# Patient Record
Sex: FEMALE | Race: WHITE | NOT HISPANIC OR LATINO | Employment: FULL TIME | URBAN - NONMETROPOLITAN AREA
[De-identification: names, ages, dates, MRNs, and addresses within clinical notes are randomized per-mention and may not be internally consistent; named-entity substitution may affect disease eponyms.]

---

## 2017-01-11 ENCOUNTER — OFFICE VISIT (OUTPATIENT)
Dept: URGENT CARE | Facility: PHYSICIAN GROUP | Age: 42
End: 2017-01-11
Payer: COMMERCIAL

## 2017-01-11 ENCOUNTER — HOSPITAL ENCOUNTER (OUTPATIENT)
Facility: MEDICAL CENTER | Age: 42
End: 2017-01-11
Attending: PHYSICIAN ASSISTANT
Payer: COMMERCIAL

## 2017-01-11 VITALS
TEMPERATURE: 98.7 F | HEIGHT: 66 IN | SYSTOLIC BLOOD PRESSURE: 126 MMHG | WEIGHT: 153 LBS | RESPIRATION RATE: 20 BRPM | BODY MASS INDEX: 24.59 KG/M2 | OXYGEN SATURATION: 100 % | DIASTOLIC BLOOD PRESSURE: 68 MMHG | HEART RATE: 77 BPM

## 2017-01-11 DIAGNOSIS — N76.0 ACUTE VAGINITIS: ICD-10-CM

## 2017-01-11 DIAGNOSIS — H10.33 ACUTE BACTERIAL CONJUNCTIVITIS OF BOTH EYES: ICD-10-CM

## 2017-01-11 LAB
CANDIDA DNA VAG QL PROBE+SIG AMP: POSITIVE
G VAGINALIS DNA VAG QL PROBE+SIG AMP: NEGATIVE
T VAGINALIS DNA VAG QL PROBE+SIG AMP: NEGATIVE

## 2017-01-11 PROCEDURE — 87510 GARDNER VAG DNA DIR PROBE: CPT

## 2017-01-11 PROCEDURE — 87491 CHLMYD TRACH DNA AMP PROBE: CPT

## 2017-01-11 PROCEDURE — 99203 OFFICE O/P NEW LOW 30 MIN: CPT | Performed by: PHYSICIAN ASSISTANT

## 2017-01-11 PROCEDURE — 87480 CANDIDA DNA DIR PROBE: CPT

## 2017-01-11 PROCEDURE — 87660 TRICHOMONAS VAGIN DIR PROBE: CPT

## 2017-01-11 PROCEDURE — 87591 N.GONORRHOEAE DNA AMP PROB: CPT

## 2017-01-11 RX ORDER — POLYMYXIN B SULFATE AND TRIMETHOPRIM 1; 10000 MG/ML; [USP'U]/ML
1 SOLUTION OPHTHALMIC EVERY 4 HOURS
Qty: 10 ML | Refills: 0 | Status: SHIPPED | OUTPATIENT
Start: 2017-01-11 | End: 2017-04-15

## 2017-01-11 RX ORDER — FLUCONAZOLE 150 MG/1
TABLET ORAL
Qty: 2 TAB | Refills: 1 | Status: SHIPPED | OUTPATIENT
Start: 2017-01-11 | End: 2017-04-15

## 2017-01-11 RX ORDER — METRONIDAZOLE 500 MG/1
500 TABLET ORAL 2 TIMES DAILY
Qty: 14 TAB | Refills: 0 | Status: SHIPPED | OUTPATIENT
Start: 2017-01-11 | End: 2017-01-18

## 2017-01-11 ASSESSMENT — ENCOUNTER SYMPTOMS
ABDOMINAL PAIN: 0
EYE DISCHARGE: 1
EYE REDNESS: 1
FEVER: 0
CHILLS: 0
DOUBLE VISION: 0
FLANK PAIN: 0
PHOTOPHOBIA: 0
EYE PAIN: 0
VAGINITIS: 1
BLURRED VISION: 0

## 2017-01-11 NOTE — PATIENT INSTRUCTIONS
Vaginitis  Vaginitis is an inflammation of the vagina. It is most often caused by a change in the normal balance of the bacteria and yeast that live in the vagina. This change in balance causes an overgrowth of certain bacteria or yeast, which causes the inflammation. There are different types of vaginitis, but the most common types are:  · Bacterial vaginosis.  · Yeast infection (candidiasis).  · Trichomoniasis vaginitis. This is a sexually transmitted infection (STI).  · Viral vaginitis.  · Atrophic vaginitis.  · Allergic vaginitis.  CAUSES   The cause depends on the type of vaginitis. Vaginitis can be caused by:  · Bacteria (bacterial vaginosis).  · Yeast (yeast infection).  · A parasite (trichomoniasis vaginitis)  · A virus (viral vaginitis).  · Low hormone levels (atrophic vaginitis). Low hormone levels can occur during pregnancy, breastfeeding, or after menopause.  · Irritants, such as bubble baths, scented tampons, and feminine sprays (allergic vaginitis).  Other factors can change the normal balance of the yeast and bacteria that live in the vagina. These include:  · Antibiotic medicines.  · Poor hygiene.  · Diaphragms, vaginal sponges, spermicides, birth control pills, and intrauterine devices (IUD).  · Sexual intercourse.  · Infection.  · Uncontrolled diabetes.  · A weakened immune system.  SYMPTOMS   Symptoms can vary depending on the cause of the vaginitis. Common symptoms include:  · Abnormal vaginal discharge.  ¨ The discharge is white, gray, or yellow with bacterial vaginosis.  ¨ The discharge is thick, white, and cheesy with a yeast infection.  ¨ The discharge is frothy and yellow or greenish with trichomoniasis.  · A bad vaginal odor.  ¨ The odor is fishy with bacterial vaginosis.  · Vaginal itching, pain, or swelling.  · Painful intercourse.  · Pain or burning when urinating.  Sometimes, there are no symptoms.  TREATMENT   Treatment will vary depending on the type of infection.   · Bacterial  vaginosis and trichomoniasis are often treated with antibiotic creams or pills.  · Yeast infections are often treated with antifungal medicines, such as vaginal creams or suppositories.  · Viral vaginitis has no cure, but symptoms can be treated with medicines that relieve discomfort. Your sexual partner should be treated as well.  · Atrophic vaginitis may be treated with an estrogen cream, pill, suppository, or vaginal ring. If vaginal dryness occurs, lubricants and moisturizing creams may help. You may be told to avoid scented soaps, sprays, or douches.  · Allergic vaginitis treatment involves quitting the use of the product that is causing the problem. Vaginal creams can be used to treat the symptoms.  HOME CARE INSTRUCTIONS   · Take all medicines as directed by your caregiver.  · Keep your genital area clean and dry. Avoid soap and only rinse the area with water.  · Avoid douching. It can remove the healthy bacteria in the vagina.  · Do not use tampons or have sexual intercourse until your vaginitis has been treated. Use sanitary pads while you have vaginitis.  · Wipe from front to back. This avoids the spread of bacteria from the rectum to the vagina.  · Let air reach your genital area.  ¨ Wear cotton underwear to decrease moisture buildup.  ¨ Avoid wearing underwear while you sleep until your vaginitis is gone.  ¨ Avoid tight pants and underwear or nylons without a cotton panel.  ¨ Take off wet clothing (especially bathing suits) as soon as possible.  · Use mild, non-scented products. Avoid using irritants, such as:  ¨ Scented feminine sprays.  ¨ Fabric softeners.  ¨ Scented detergents.  ¨ Scented tampons.  ¨ Scented soaps or bubble baths.  · Practice safe sex and use condoms. Condoms may prevent the spread of trichomoniasis and viral vaginitis.  SEEK MEDICAL CARE IF:   · You have abdominal pain.  · You have a fever or persistent symptoms for more than 2-3 days.  · You have a fever and your symptoms suddenly  "get worse.     This information is not intended to replace advice given to you by your health care provider. Make sure you discuss any questions you have with your health care provider.     Document Released: 10/14/2008 Document Revised: 05/03/2016 Document Reviewed: 05/30/2013  [employer] Interactive Patient Education ©2016 [employer]    Conjunctivitis  Conjunctivitis is commonly called \"pink eye.\" Conjunctivitis can be caused by bacterial or viral infection, allergies, or injuries. There is usually redness of the lining of the eye, itching, discomfort, and sometimes discharge. There may be deposits of matter along the eyelids. A viral infection usually causes a watery discharge, while a bacterial infection causes a yellowish, thick discharge. Pink eye is very contagious and spreads by direct contact.  You may be given antibiotic eyedrops as part of your treatment. Before using your eye medicine, remove all drainage from the eye by washing gently with warm water and cotton balls. Continue to use the medication until you have awakened 2 mornings in a row without discharge from the eye. Do not rub your eye. This increases the irritation and helps spread infection. Use separate towels from other household members. Wash your hands with soap and water before and after touching your eyes. Use cold compresses to reduce pain and sunglasses to relieve irritation from light. Do not wear contact lenses or wear eye makeup until the infection is gone.  SEEK MEDICAL CARE IF:   · Your symptoms are not better after 3 days of treatment.  · You have increased pain or trouble seeing.  · The outer eyelids become very red or swollen.  Document Released: 01/25/2006 Document Revised: 03/11/2013 Document Reviewed: 12/18/2006  ExitCare® Patient Information ©2014 [employer].    "

## 2017-01-11 NOTE — MR AVS SNAPSHOT
"        Cary Cano   2017 12:30 PM   Office Visit   MRN: [id number]    Department:  [facility]   Dept Phone:  [phone]    Description:  Female : 1975   Provider:  CRYSTAL Rogers           Reason for Visit     Vaginitis x3wks Pt states she may have yeast infection, OTC meds with no improvement, x2day eye swelling/redness      Allergies as of 2017     No Known Allergies      You were diagnosed with     Acute vaginitis   [888342]   Fluctuating for 3 weeks. Cultures obtained. Will treat for presumed bacterial and candidal infection. Follow-up with PCP as needed.    Acute bacterial conjunctivitis of both eyes   [[id number]]   Bilateral conjunctival erythema with purulent discharge. Start Polytrim. Follow-up with PCP as needed. Given written instructions.      Vital Signs     Blood Pressure Pulse Temperature Respirations Height Weight    126/68 mmHg 77 37.1 °C (98.7 °F) 20 1.676 m (5' 6\") 69.4 kg (153 lb)    Body Mass Index Oxygen Saturation Breastfeeding? Smoking Status          24.71 kg/m2 100% No Never Smoker         Basic Information     Date Of Birth Sex Race Ethnicity Preferred Language    1975 Female White Non- English      Health Maintenance     Patient has no pending health maintenance at this time      Current Immunizations     No immunizations on file.      Below and/or attached are the medications your provider expects you to take. Review all of your home medications and newly ordered medications with your provider and/or pharmacist. Follow medication instructions as directed by your provider and/or pharmacist. Please keep your medication list with you and share with your provider. Update the information when medications are discontinued, doses are changed, or new medications (including over-the-counter products) are added; and carry medication information at all times in the event of emergency situations     Allergies:  No Known Allergies          Medications  Valid " as of: January 11, 2017 -  1:01 PM    Generic Name Brand Name Tablet Size Instructions for use    Fluconazole (Tab) DIFLUCAN 150 MG Take 1 tab now and one in 72 hours if not resolved.        MetroNIDAZOLE (Tab) FLAGYL 500 MG Take 1 Tab by mouth 2 Times a Day for 7 days.        Phenazopyridine HCl   Take  by mouth.        Polymyxin B-Trimethoprim (Solution) POLYTRIM 72304-6.1 UNIT/ML-% Place 1 Drop in both eyes every 4 hours.        .                 Medicines prescribed today were sent to:     [address] [address]    Phone: [phone] Fax: [phone]    Open 24 Hours?: No      Medication refill instructions:       If your prescription bottle indicates you have medication refills left, it is not necessary to call your provider’s office. Please contact your pharmacy and they will refill your medication.    If your prescription bottle indicates you do not have any refills left, you may request refills at any time through one of the following ways: The online [employer] system (except Urgent Care), by calling your provider’s office, or by asking your pharmacy to contact your provider’s office with a refill request. Medication refills are processed only during regular business hours and may not be available until the next business day. Your provider may request additional information or to have a follow-up visit with you prior to refilling your medication.   *Please Note: Medication refills are assigned a new Rx number when refilled electronically. Your pharmacy may indicate that no refills were authorized even though a new prescription for the same medication is available at the pharmacy. Please request the medicine by name with the pharmacy before contacting your provider for a refill.        Your To Do List     Future Labs/Procedures Complete By Expires    CHLAMYDIA/GC PCR URINE OR SWAB  As directed 1/11/2018    VAGINAL PATHOGENS DNA PANEL  As  directed 1/12/2018      Instructions    Vaginitis  Vaginitis is an inflammation of the vagina. It is most often caused by a change in the normal balance of the bacteria and yeast that live in the vagina. This change in balance causes an overgrowth of certain bacteria or yeast, which causes the inflammation. There are different types of vaginitis, but the most common types are:  · Bacterial vaginosis.  · Yeast infection (candidiasis).  · Trichomoniasis vaginitis. This is a sexually transmitted infection (STI).  · Viral vaginitis.  · Atrophic vaginitis.  · Allergic vaginitis.  CAUSES   The cause depends on the type of vaginitis. Vaginitis can be caused by:  · Bacteria (bacterial vaginosis).  · Yeast (yeast infection).  · A parasite (trichomoniasis vaginitis)  · A virus (viral vaginitis).  · Low hormone levels (atrophic vaginitis). Low hormone levels can occur during pregnancy, breastfeeding, or after menopause.  · Irritants, such as bubble baths, scented tampons, and feminine sprays (allergic vaginitis).  Other factors can change the normal balance of the yeast and bacteria that live in the vagina. These include:  · Antibiotic medicines.  · Poor hygiene.  · Diaphragms, vaginal sponges, spermicides, birth control pills, and intrauterine devices (IUD).  · Sexual intercourse.  · Infection.  · Uncontrolled diabetes.  · A weakened immune system.  SYMPTOMS   Symptoms can vary depending on the cause of the vaginitis. Common symptoms include:  · Abnormal vaginal discharge.  ¨ The discharge is white, gray, or yellow with bacterial vaginosis.  ¨ The discharge is thick, white, and cheesy with a yeast infection.  ¨ The discharge is frothy and yellow or greenish with trichomoniasis.  · A bad vaginal odor.  ¨ The odor is fishy with bacterial vaginosis.  · Vaginal itching, pain, or swelling.  · Painful intercourse.  · Pain or burning when urinating.  Sometimes, there are no symptoms.  TREATMENT   Treatment will vary depending on the  type of infection.   · Bacterial vaginosis and trichomoniasis are often treated with antibiotic creams or pills.  · Yeast infections are often treated with antifungal medicines, such as vaginal creams or suppositories.  · Viral vaginitis has no cure, but symptoms can be treated with medicines that relieve discomfort. Your sexual partner should be treated as well.  · Atrophic vaginitis may be treated with an estrogen cream, pill, suppository, or vaginal ring. If vaginal dryness occurs, lubricants and moisturizing creams may help. You may be told to avoid scented soaps, sprays, or douches.  · Allergic vaginitis treatment involves quitting the use of the product that is causing the problem. Vaginal creams can be used to treat the symptoms.  HOME CARE INSTRUCTIONS   · Take all medicines as directed by your caregiver.  · Keep your genital area clean and dry. Avoid soap and only rinse the area with water.  · Avoid douching. It can remove the healthy bacteria in the vagina.  · Do not use tampons or have sexual intercourse until your vaginitis has been treated. Use sanitary pads while you have vaginitis.  · Wipe from front to back. This avoids the spread of bacteria from the rectum to the vagina.  · Let air reach your genital area.  ¨ Wear cotton underwear to decrease moisture buildup.  ¨ Avoid wearing underwear while you sleep until your vaginitis is gone.  ¨ Avoid tight pants and underwear or nylons without a cotton panel.  ¨ Take off wet clothing (especially bathing suits) as soon as possible.  · Use mild, non-scented products. Avoid using irritants, such as:  ¨ Scented feminine sprays.  ¨ Fabric softeners.  ¨ Scented detergents.  ¨ Scented tampons.  ¨ Scented soaps or bubble baths.  · Practice safe sex and use condoms. Condoms may prevent the spread of trichomoniasis and viral vaginitis.  SEEK MEDICAL CARE IF:   · You have abdominal pain.  · You have a fever or persistent symptoms for more than 2-3 days.  · You have a  "fever and your symptoms suddenly get worse.     This information is not intended to replace advice given to you by your health care provider. Make sure you discuss any questions you have with your health care provider.     Document Released: 10/14/2008 Document Revised: 05/03/2016 Document Reviewed: 05/30/2013  [employer] Interactive Patient Education ©2016 [employer]    Conjunctivitis  Conjunctivitis is commonly called \"pink eye.\" Conjunctivitis can be caused by bacterial or viral infection, allergies, or injuries. There is usually redness of the lining of the eye, itching, discomfort, and sometimes discharge. There may be deposits of matter along the eyelids. A viral infection usually causes a watery discharge, while a bacterial infection causes a yellowish, thick discharge. Pink eye is very contagious and spreads by direct contact.  You may be given antibiotic eyedrops as part of your treatment. Before using your eye medicine, remove all drainage from the eye by washing gently with warm water and cotton balls. Continue to use the medication until you have awakened 2 mornings in a row without discharge from the eye. Do not rub your eye. This increases the irritation and helps spread infection. Use separate towels from other household members. Wash your hands with soap and water before and after touching your eyes. Use cold compresses to reduce pain and sunglasses to relieve irritation from light. Do not wear contact lenses or wear eye makeup until the infection is gone.  SEEK MEDICAL CARE IF:   · Your symptoms are not better after 3 days of treatment.  · You have increased pain or trouble seeing.  · The outer eyelids become very red or swollen.  Document Released: 01/25/2006 Document Revised: 03/11/2013 Document Reviewed: 12/18/2006  ExitCare® Patient Information ©2014 [employer].            [facility] Access Code: [email or web address]  Expires: 2/10/2017  1:01 PM    [employer] [facility]  A secure, online tool to manage your " health information     [employer]s [employer]® is a secure, online tool that connects you to your personalized health information from the privacy of your home -- day or night - making it very easy for you to manage your healthcare. Once the activation process is completed, you can even access your medical information using the [employer] allan, which is available for free in the Apple Allan store or Google Play store.     [employer] provides the following levels of access (as shown below):   My Chart Features   [facility] Doctor [facility]  Specialists [facility]  Urgent  Care Non-[facility]  Primary Care  Doctor   Email your healthcare team securely and privately 24/7 X X X    Manage appointments: schedule your next appointment; view details of past/upcoming appointments X      Request prescription refills. X      View recent personal medical records, including lab and immunizations X X X X   View health record, including health history, allergies, medications X X X X   Read reports about your outpatient visits, procedures, consult and ER notes X X X X   See your discharge summary, which is a recap of your hospital and/or ER visit that includes your diagnosis, lab results, and care plan. X X       How to register for [employer]:  1. Go to  [email or web address].  2. Click on the Sign Up Now box, which takes you to the New Member Sign Up page. You will need to provide the following information:  a. Enter your [employer] Access Code exactly as it appears at the top of this page. (You will not need to use this code after you’ve completed the sign-up process. If you do not sign up before the expiration date, you must request a new code.)   b. Enter your date of birth.   c. Enter your home email address.   d. Click Submit, and follow the next screen’s instructions.  3. Create a [employer] ID. This will be your [employer] login ID and cannot be changed, so think of one that is secure and easy to remember.  4. Create a [employer] password.  You can change your password at any time.  5. Enter your Password Reset Question and Answer. This can be used at a later time if you forget your password.   6. Enter your e-mail address. This allows you to receive e-mail notifications when new information is available in [employer].  7. Click Sign Up. You can now view your health information.    For assistance activating your [employer] account, call [phone]

## 2017-01-11 NOTE — PROGRESS NOTES
Subjective:      Cary Cano is a 41 y.o. female who presents with Vaginitis            HPI Comments: Patient presents today with 3 week history of vaginal irritation, itching, and mild discharge. She is prone to yeast and bacterial vaginal infections. She has tried over-the-counter antifungal medications without any significant improvement in symptoms. She would like to be treated for both today.    She also reports bilateral eye redness and discharge for the last 2 days. This morning when she woke up she had purulent discharge coming from both eyes. She tried over-the-counter allergy eyedrops with mild improvement in irritation, but continued purulent discharge.    Vaginitis  The patient's primary symptoms include genital itching and vaginal discharge. The patient's pertinent negatives include no genital odor or genital rash. This is a new problem. The current episode started 1 to 4 weeks ago. The problem occurs constantly. The problem has been waxing and waning. The patient is experiencing no pain. The problem affects both sides. She is not pregnant. Pertinent negatives include no abdominal pain, chills, discolored urine, dysuria, fever, flank pain, frequency, hematuria or urgency. The vaginal discharge was thin. There has been no bleeding. She has not been passing clots. She has not been passing tissue. Nothing aggravates the symptoms. She has tried antifungals for the symptoms. The treatment provided no relief. She is sexually active. It is possible that her partner has an STD.       Review of Systems   Constitutional: Negative for fever and chills.   Eyes: Positive for discharge and redness. Negative for blurred vision, double vision, photophobia and pain.   Gastrointestinal: Negative for abdominal pain.   Genitourinary: Positive for vaginal discharge. Negative for dysuria, urgency, frequency, hematuria and flank pain.     Allergies:Review of patient's allergies indicates no known allergies.    Current  "Outpatient Prescriptions Ordered in [facility]   Medication Sig Dispense Refill   • Phenazopyridine HCl (AZO TABS PO) Take  by mouth.     • metronidazole (FLAGYL) 500 MG Tab Take 1 Tab by mouth 2 Times a Day for 7 days. 14 Tab 0   • fluconazole (DIFLUCAN) 150 MG tablet Take 1 tab now and one in 72 hours if not resolved. 2 Tab 1   • polymixin-trimethoprim (POLYTRIM) 72138-2.1 UNIT/ML-% Solution Place 1 Drop in both eyes every 4 hours. 10 mL 0     No current Epic-ordered facility-administered medications on file.       History reviewed. No pertinent past medical history.    Social History   Substance Use Topics   • Smoking status: Never Smoker    • Smokeless tobacco: Never Used   • Alcohol Use: Yes       No family status information on file.   History reviewed. No pertinent family history.       Objective:     /68 mmHg  Pulse 77  Temp(Src) 37.1 °C (98.7 °F)  Resp 20  Ht 1.676 m (5' 6\")  Wt 69.4 kg (153 lb)  BMI 24.71 kg/m2  SpO2 100%  Breastfeeding? No     Physical Exam   Constitutional: She is oriented to person, place, and time. She appears well-developed and well-nourished. No distress.   HENT:   Head: Normocephalic and atraumatic.   Eyes: EOM are normal. Pupils are equal, round, and reactive to light. Right eye exhibits discharge. Left eye exhibits no discharge.   Bilateral conjunctival erythema with mild purulent discharge from the right eye   Neck: Normal range of motion. Neck supple.   Cardiovascular: Normal rate.    Pulmonary/Chest: Effort normal.   Genitourinary:   Chaperone: FRANK Pedroza. Mild labial erythema and tenderness. Internal exam mildly discomfort uncomfortable. Vaginal erythema with thick, white discharge and also thinner, yellow discharge. Cervix is mildly erythematous with discharge present. IUD strings visible.   Neurological: She is alert and oriented to person, place, and time.   Skin: Skin is warm and dry. She is not diaphoretic.   Psychiatric: She has a normal mood and affect. Her " behavior is normal. Judgment and thought content normal.   Nursing note and vitals reviewed.              Assessment/Plan:     1. Acute vaginitis  CHLAMYDIA/GC PCR URINE OR SWAB    VAGINAL PATHOGENS DNA PANEL    metronidazole (FLAGYL) 500 MG Tab    fluconazole (DIFLUCAN) 150 MG tablet    Fluctuating for 3 weeks. Cultures obtained. Will treat for presumed bacterial and candidal infection. Follow-up with PCP as needed.   2. Acute bacterial conjunctivitis of both eyes  polymixin-trimethoprim (POLYTRIM) 50170-6.1 UNIT/ML-% Solution    Bilateral conjunctival erythema with purulent discharge. Start Polytrim. Follow-up with PCP as needed. Given written instructions.       [employer] Interactive Patient Education given: Vaginitis, conjunctivitis    Please note that this dictation was created using voice recognition software. I have made every reasonable attempt to correct obvious errors, but I expect that there are errors of grammar and possibly content that I did not discover before finalizing the note.

## 2017-01-12 LAB
C TRACH DNA GENITAL QL NAA+PROBE: NEGATIVE
N GONORRHOEA DNA GENITAL QL NAA+PROBE: NEGATIVE
SPECIMEN SOURCE: NORMAL

## 2017-04-15 ENCOUNTER — OFFICE VISIT (OUTPATIENT)
Dept: URGENT CARE | Facility: PHYSICIAN GROUP | Age: 42
End: 2017-04-15
Payer: COMMERCIAL

## 2017-04-15 VITALS
TEMPERATURE: 98.7 F | HEART RATE: 108 BPM | BODY MASS INDEX: 26.48 KG/M2 | WEIGHT: 164 LBS | DIASTOLIC BLOOD PRESSURE: 82 MMHG | RESPIRATION RATE: 16 BRPM | SYSTOLIC BLOOD PRESSURE: 124 MMHG | OXYGEN SATURATION: 98 %

## 2017-04-15 DIAGNOSIS — R31.9 HEMATURIA: ICD-10-CM

## 2017-04-15 DIAGNOSIS — R30.0 DYSURIA: ICD-10-CM

## 2017-04-15 PROCEDURE — 99214 OFFICE O/P EST MOD 30 MIN: CPT | Performed by: PHYSICIAN ASSISTANT

## 2017-04-15 RX ORDER — NITROFURANTOIN 25; 75 MG/1; MG/1
100 CAPSULE ORAL 2 TIMES DAILY
Qty: 10 CAP | Refills: 0 | Status: SHIPPED | OUTPATIENT
Start: 2017-04-15 | End: 2017-04-20

## 2017-04-15 RX ORDER — PHENAZOPYRIDINE HYDROCHLORIDE 200 MG/1
200 TABLET, FILM COATED ORAL 3 TIMES DAILY PRN
Qty: 6 TAB | Refills: 0 | Status: SHIPPED | OUTPATIENT
Start: 2017-04-15 | End: 2023-11-14

## 2017-04-15 ASSESSMENT — ENCOUNTER SYMPTOMS
SWEATS: 0
FLANK PAIN: 0
VOMITING: 0
FEVER: 0
CHILLS: 0
NAUSEA: 0

## 2017-04-15 NOTE — MR AVS SNAPSHOT
Cary Cano   4/15/2017 1:45 PM   Office Visit   MRN: [id number]    Department:  [facility]   Dept Phone:  [phone]    Description:  Female : 1975   Provider:  WAYNE Rogers.           Reason for Visit     UTI           Allergies as of 4/15/2017     No Known Allergies      You were diagnosed with     Dysuria   [788.1.ICD-9-CM]   Dysuria, urgency, frequency for a couple of days. Clinically appears to be UTI. Urinalysis unremarkable. Contingent Macrobid. Follow-up with PCP    Hematuria   [[id number]]   Trace hematuria. Suspect infection. Given written instructions. Follow-up with PCP.      Vital Signs     Blood Pressure Pulse Temperature Respirations Weight Oxygen Saturation    124/82 mmHg 108 37.1 °C (98.7 °F) 16 74.39 kg (164 lb) 98%    Smoking Status                   Never Smoker            Basic Information     Date Of Birth Sex Race Ethnicity Preferred Language    1975 Female White Non- English      Health Maintenance        Date Due Completion Dates    IMM DTaP/Tdap/Td Vaccine (1 - Tdap) 1995 ---    PAP SMEAR 1997 ---    MAMMOGRAM 2016 ---            Current Immunizations     No immunizations on file.      Below and/or attached are the medications your provider expects you to take. Review all of your home medications and newly ordered medications with your provider and/or pharmacist. Follow medication instructions as directed by your provider and/or pharmacist. Please keep your medication list with you and share with your provider. Update the information when medications are discontinued, doses are changed, or new medications (including over-the-counter products) are added; and carry medication information at all times in the event of emergency situations     Allergies:  No Known Allergies          Medications  Valid as of: April 15, 2017 -  2:15 PM    Generic Name Brand Name Tablet Size Instructions for use    Nitrofurantoin Monohyd Macro (Cap) MACROBID  100 MG Take 1 Cap by mouth 2 times a day for 5 days.        Phenazopyridine HCl (Tab) PYRIDIUM 200 MG Take 1 Tab by mouth 3 times a day as needed.        .                 Medicines prescribed today were sent to:     [address] [address]    Phone: [phone] Fax: [phone]    Open 24 Hours?: No      Medication refill instructions:       If your prescription bottle indicates you have medication refills left, it is not necessary to call your provider’s office. Please contact your pharmacy and they will refill your medication.    If your prescription bottle indicates you do not have any refills left, you may request refills at any time through one of the following ways: The online TermScout system (except Urgent Care), by calling your provider’s office, or by asking your pharmacy to contact your provider’s office with a refill request. Medication refills are processed only during regular business hours and may not be available until the next business day. Your provider may request additional information or to have a follow-up visit with you prior to refilling your medication.   *Please Note: Medication refills are assigned a new Rx number when refilled electronically. Your pharmacy may indicate that no refills were authorized even though a new prescription for the same medication is available at the pharmacy. Please request the medicine by name with the pharmacy before contacting your provider for a refill.        Instructions    Dysuria  Dysuria is pain or discomfort while urinating. The pain or discomfort may be felt in the tube that carries urine out of the bladder (urethra) or in the surrounding tissue of the genitals. The pain may also be felt in the groin area, lower abdomen, and lower back. You may have to urinate frequently or have the sudden feeling that you have to urinate (urgency). Dysuria can affect both men and women, but is more  common in women.  Dysuria can be caused by many different things, including:  · Urinary tract infection in women.  · Infection of the kidney or bladder.  · Kidney stones or bladder stones.  · Certain sexually transmitted infections (STIs), such as chlamydia.  · Dehydration.  · Inflammation of the vagina.  · Use of certain medicines.  · Use of certain soaps or scented products that cause irritation.  HOME CARE INSTRUCTIONS  Watch your dysuria for any changes. The following actions may help to reduce any discomfort you are feeling:  · Drink enough fluid to keep your urine clear or pale yellow.  · Empty your bladder often. Avoid holding urine for long periods of time.  · After a bowel movement or urination, women should cleanse from front to back, using each tissue only once.  · Empty your bladder after sexual intercourse.  · Take medicines only as directed by your health care provider.  · If you were prescribed an antibiotic medicine, finish it all even if you start to feel better.  · Avoid caffeine, tea, and alcohol. They can irritate the bladder and make dysuria worse. In men, alcohol may irritate the prostate.  · Keep all follow-up visits as directed by your health care provider. This is important.  · If you had any tests done to find the cause of dysuria, it is your responsibility to obtain your test results. Ask the lab or department performing the test when and how you will get your results. Talk with your health care provider if you have any questions about your results.  SEEK MEDICAL CARE IF:  · You develop pain in your back or sides.  · You have a fever.  · You have nausea or vomiting.  · You have blood in your urine.  · You are not urinating as often as you usually do.  SEEK IMMEDIATE MEDICAL CARE IF:  · You pain is severe and not relieved with medicines.  · You are unable to hold down any fluids.  · You or someone else notices a change in your mental function.  · You have a rapid heartbeat at rest.  · You  have shaking or chills.  · You feel extremely weak.     This information is not intended to replace advice given to you by your health care provider. Make sure you discuss any questions you have with your health care provider.     Document Released: 09/15/2005 Document Revised: 01/08/2016 Document Reviewed: 08/13/2015  [employer] Interactive Patient Education ©2016 Elsevier Inc.    Hematuria  Hematuria is the presence of blood in the urine. This condition can result from many problems. Some of these are:   · Urinary infections.   · Injuries.   · Kidney stones.   · Drug reactions.   · Tumors.   · Other diseases.   The treatment depends on the diagnosis. Further studies may be needed to find the cause even if the hematuria subsides on its own. An exam by an urologist may also be indicated.  If you are bleeding heavily, or have prostate problems, clots can form in the bladder and block the passage of urine. This requires emergency treatment with a catheter and bladder irrigation.   Contact your doctor for follow-up care within the next 2-4 days.  SEEK IMMEDIATE MEDICAL CARE IF:  You develop a fever, abdominal pain, urinary blockage, vomiting, or any other serious problems.  Document Released: 01/25/2006 Document Revised: 03/11/2013 Document Reviewed: 10/30/2009  ExitCare® Patient Information ©2013 [employer].            Tribesports Access Code: [email or web address]  Expires: 5/1/2017 10:01 AM    Tribesports  A secure, online tool to manage your health information     [employer] Tribesports® is a secure, online tool that connects you to your personalized health information from the privacy of your home -- day or night - making it very easy for you to manage your healthcare. Once the activation process is completed, you can even access your medical information using the Tribesports allan, which is available for free in the Apple Allan store or Google Play store.     Tribesports provides the following levels of access (as shown below):   My  Chart Features   [facility] Doctor [facility]  Specialists [facility]  Urgent  Care Non-[facility]  Primary Care  Doctor   Email your healthcare team securely and privately 24/7 X X X    Manage appointments: schedule your next appointment; view details of past/upcoming appointments X      Request prescription refills. X      View recent personal medical records, including lab and immunizations X X X X   View health record, including health history, allergies, medications X X X X   Read reports about your outpatient visits, procedures, consult and ER notes X X X X   See your discharge summary, which is a recap of your hospital and/or ER visit that includes your diagnosis, lab results, and care plan. X X       How to register for [employer]:  1. Go to  [email or web address].  2. Click on the Sign Up Now box, which takes you to the New Member Sign Up page. You will need to provide the following information:  a. Enter your R[employer] Access Code exactly as it appears at the top of this page. (You will not need to use this code after you’ve completed the sign-up process. If you do not sign up before the expiration date, you must request a new code.)   b. Enter your date of birth.   c. Enter your home email address.   d. Click Submit, and follow the next screen’s instructions.  3. Create a Rocket Design ID. This will be your R[employer] login ID and cannot be changed, so think of one that is secure and easy to remember.  4. Create a [employer] password. You can change your password at any time.  5. Enter your Password Reset Question and Answer. This can be used at a later time if you forget your password.   6. Enter your e-mail address. This allows you to receive e-mail notifications when new information is available in [employer].  7. Click Sign Up. You can now view your health information.    For assistance activating your [employer] account, call [phone]

## 2017-04-15 NOTE — PROGRESS NOTES
Subjective:      Cary Cano is a 41 y.o. female who presents with UTI            HPI Comments: Several day history of gradually worsening dysuria, urgency, frequency, and lower abdominal/pelvic discomfort. History of UTIs with similar presentation. No fevers, chills, nausea, or vomiting. Patient took a home test which was positive for leukocytes and nitrates    Dysuria   This is a new problem. The current episode started in the past 7 days. The problem occurs every urination. The problem has been gradually worsening. The quality of the pain is described as aching and burning. The pain is mild. There is a history of pyelonephritis. Associated symptoms include frequency and urgency. Pertinent negatives include no chills, discharge, flank pain, hematuria, hesitancy, nausea, possible pregnancy, sweats or vomiting. She has tried increased fluids (Cystex) for the symptoms. The treatment provided mild relief.       Review of Systems   Constitutional: Negative for fever and chills.   Gastrointestinal: Negative for nausea and vomiting.   Genitourinary: Positive for dysuria, urgency and frequency. Negative for hesitancy, hematuria and flank pain.     Allergies:Review of patient's allergies indicates no known allergies.    Current Outpatient Prescriptions Ordered in [facility]   Medication Sig Dispense Refill   • nitrofurantoin monohydr macro (MACROBID) 100 MG Cap Take 1 Cap by mouth 2 times a day for 5 days. 10 Cap 0   • phenazopyridine (PYRIDIUM) 200 MG Tab Take 1 Tab by mouth 3 times a day as needed. 6 Tab 0     No current Epic-ordered facility-administered medications on file.       History reviewed. No pertinent past medical history.    Social History   Substance Use Topics   • Smoking status: Never Smoker    • Smokeless tobacco: Never Used   • Alcohol Use: Yes       No family status information on file.   History reviewed. No pertinent family history.       Objective:     /82 mmHg  Pulse 108  Temp(Src) 37.1 °C (98.7  °F)  Resp 16  Wt 74.39 kg (164 lb)  SpO2 98%     Physical Exam   Constitutional: She is oriented to person, place, and time. She appears well-developed and well-nourished. No distress.   HENT:   Head: Normocephalic and atraumatic.   Neck: Normal range of motion. Neck supple.   Cardiovascular: Regular rhythm.    Tachycardic   Pulmonary/Chest: Effort normal and breath sounds normal.   Abdominal: Soft. Bowel sounds are normal. She exhibits no distension and no mass. There is tenderness (mild, suprapubic). There is no rebound and no guarding.   No CVA tenderness   Neurological: She is alert and oriented to person, place, and time.   Skin: Skin is warm and dry. She is not diaphoretic.   Psychiatric: She has a normal mood and affect. Her behavior is normal. Judgment and thought content normal.   Nursing note and vitals reviewed.    Labs: Urinalysis positive for blood          Assessment/Plan:     1. Dysuria  nitrofurantoin monohydr macro (MACROBID) 100 MG Cap    phenazopyridine (PYRIDIUM) 200 MG Tab    Dysuria, urgency, frequency for a couple of days. Clinically appears to be UTI. Urinalysis unremarkable. Contingent Macrobid. Follow-up with PCP   2. Hematuria  nitrofurantoin monohydr macro (MACROBID) 100 MG Cap    phenazopyridine (PYRIDIUM) 200 MG Tab    Trace hematuria. Suspect infection. Given written instructions. Follow-up with PCP.       [employer] Interactive Patient Education given: Dysuria, hematuria    Please note that this dictation was created using voice recognition software. I have made every reasonable attempt to correct obvious errors, but I expect that there are errors of grammar and possibly content that I did not discover before finalizing the note.

## 2017-04-15 NOTE — PATIENT INSTRUCTIONS
Dysuria  Dysuria is pain or discomfort while urinating. The pain or discomfort may be felt in the tube that carries urine out of the bladder (urethra) or in the surrounding tissue of the genitals. The pain may also be felt in the groin area, lower abdomen, and lower back. You may have to urinate frequently or have the sudden feeling that you have to urinate (urgency). Dysuria can affect both men and women, but is more common in women.  Dysuria can be caused by many different things, including:  · Urinary tract infection in women.  · Infection of the kidney or bladder.  · Kidney stones or bladder stones.  · Certain sexually transmitted infections (STIs), such as chlamydia.  · Dehydration.  · Inflammation of the vagina.  · Use of certain medicines.  · Use of certain soaps or scented products that cause irritation.  HOME CARE INSTRUCTIONS  Watch your dysuria for any changes. The following actions may help to reduce any discomfort you are feeling:  · Drink enough fluid to keep your urine clear or pale yellow.  · Empty your bladder often. Avoid holding urine for long periods of time.  · After a bowel movement or urination, women should cleanse from front to back, using each tissue only once.  · Empty your bladder after sexual intercourse.  · Take medicines only as directed by your health care provider.  · If you were prescribed an antibiotic medicine, finish it all even if you start to feel better.  · Avoid caffeine, tea, and alcohol. They can irritate the bladder and make dysuria worse. In men, alcohol may irritate the prostate.  · Keep all follow-up visits as directed by your health care provider. This is important.  · If you had any tests done to find the cause of dysuria, it is your responsibility to obtain your test results. Ask the lab or department performing the test when and how you will get your results. Talk with your health care provider if you have any questions about your results.  SEEK MEDICAL CARE  IF:  · You develop pain in your back or sides.  · You have a fever.  · You have nausea or vomiting.  · You have blood in your urine.  · You are not urinating as often as you usually do.  SEEK IMMEDIATE MEDICAL CARE IF:  · You pain is severe and not relieved with medicines.  · You are unable to hold down any fluids.  · You or someone else notices a change in your mental function.  · You have a rapid heartbeat at rest.  · You have shaking or chills.  · You feel extremely weak.     This information is not intended to replace advice given to you by your health care provider. Make sure you discuss any questions you have with your health care provider.     Document Released: 09/15/2005 Document Revised: 01/08/2016 Document Reviewed: 08/13/2015  [employer] Patient Education ©2016 Elsevier Inc.    Hematuria  Hematuria is the presence of blood in the urine. This condition can result from many problems. Some of these are:   · Urinary infections.   · Injuries.   · Kidney stones.   · Drug reactions.   · Tumors.   · Other diseases.   The treatment depends on the diagnosis. Further studies may be needed to find the cause even if the hematuria subsides on its own. An exam by an urologist may also be indicated.  If you are bleeding heavily, or have prostate problems, clots can form in the bladder and block the passage of urine. This requires emergency treatment with a catheter and bladder irrigation.   Contact your doctor for follow-up care within the next 2-4 days.  SEEK IMMEDIATE MEDICAL CARE IF:  You develop a fever, abdominal pain, urinary blockage, vomiting, or any other serious problems.  Document Released: 01/25/2006 Document Revised: 03/11/2013 Document Reviewed: 10/30/2009  [employer]roMaps® Patient Information ©2013 [email or web address] Ã¨â€¹Â±Ã¨â€¦Â¾Ã§Â½â€˜.

## 2018-10-21 ENCOUNTER — OFFICE VISIT (OUTPATIENT)
Dept: URGENT CARE | Facility: PHYSICIAN GROUP | Age: 43
End: 2018-10-21
Payer: COMMERCIAL

## 2018-10-21 VITALS
TEMPERATURE: 99.2 F | WEIGHT: 143.08 LBS | HEIGHT: 66 IN | DIASTOLIC BLOOD PRESSURE: 76 MMHG | RESPIRATION RATE: 16 BRPM | HEART RATE: 86 BPM | BODY MASS INDEX: 22.99 KG/M2 | SYSTOLIC BLOOD PRESSURE: 120 MMHG | OXYGEN SATURATION: 98 %

## 2018-10-21 DIAGNOSIS — R30.0 DYSURIA: ICD-10-CM

## 2018-10-21 DIAGNOSIS — R11.2 NAUSEA AND VOMITING, INTRACTABILITY OF VOMITING NOT SPECIFIED, UNSPECIFIED VOMITING TYPE: ICD-10-CM

## 2018-10-21 DIAGNOSIS — N30.01 ACUTE CYSTITIS WITH HEMATURIA: ICD-10-CM

## 2018-10-21 DIAGNOSIS — R39.15 URINARY URGENCY: ICD-10-CM

## 2018-10-21 DIAGNOSIS — R30.9 PAINFUL URINATION: ICD-10-CM

## 2018-10-21 DIAGNOSIS — T36.95XA ANTIBIOTIC-INDUCED YEAST INFECTION: ICD-10-CM

## 2018-10-21 DIAGNOSIS — B37.9 ANTIBIOTIC-INDUCED YEAST INFECTION: ICD-10-CM

## 2018-10-21 DIAGNOSIS — R35.0 URINARY FREQUENCY: ICD-10-CM

## 2018-10-21 LAB
APPEARANCE UR: CLEAR
BILIRUB UR STRIP-MCNC: NEGATIVE MG/DL
COLOR UR AUTO: YELLOW
GLUCOSE UR STRIP.AUTO-MCNC: NEGATIVE MG/DL
KETONES UR STRIP.AUTO-MCNC: 15 MG/DL
LEUKOCYTE ESTERASE UR QL STRIP.AUTO: ABNORMAL
NITRITE UR QL STRIP.AUTO: NEGATIVE
PH UR STRIP.AUTO: 6 [PH] (ref 5–8)
PROT UR QL STRIP: NEGATIVE MG/DL
RBC UR QL AUTO: ABNORMAL
SP GR UR STRIP.AUTO: 1.03
UROBILINOGEN UR STRIP-MCNC: 0.2 MG/DL

## 2018-10-21 PROCEDURE — 99214 OFFICE O/P EST MOD 30 MIN: CPT | Performed by: PHYSICIAN ASSISTANT

## 2018-10-21 PROCEDURE — 81002 URINALYSIS NONAUTO W/O SCOPE: CPT | Performed by: PHYSICIAN ASSISTANT

## 2018-10-21 RX ORDER — ONDANSETRON 4 MG/1
4 TABLET, ORALLY DISINTEGRATING ORAL ONCE
Status: COMPLETED | OUTPATIENT
Start: 2018-10-21 | End: 2018-10-21

## 2018-10-21 RX ORDER — OMEPRAZOLE 20 MG/1
20 CAPSULE, DELAYED RELEASE ORAL DAILY
COMMUNITY

## 2018-10-21 RX ORDER — ONDANSETRON 4 MG/1
4 TABLET, ORALLY DISINTEGRATING ORAL EVERY 8 HOURS PRN
Qty: 20 TAB | Refills: 0 | Status: SHIPPED | OUTPATIENT
Start: 2018-10-21 | End: 2023-11-14

## 2018-10-21 RX ORDER — NITROFURANTOIN 25; 75 MG/1; MG/1
100 CAPSULE ORAL EVERY 12 HOURS
Qty: 10 CAP | Refills: 0 | Status: SHIPPED | OUTPATIENT
Start: 2018-10-21 | End: 2018-10-26

## 2018-10-21 RX ORDER — FLUCONAZOLE 150 MG/1
150 TABLET ORAL ONCE
Qty: 1 TAB | Refills: 0 | Status: SHIPPED | OUTPATIENT
Start: 2018-10-21 | End: 2018-10-21

## 2018-10-21 RX ADMIN — ONDANSETRON 4 MG: 4 TABLET, ORALLY DISINTEGRATING ORAL at 12:17

## 2018-10-21 NOTE — LETTER
October 21, 2018         Patient: Cary Cano   YOB: 1975   Date of Visit: 10/21/2018           To Whom it May Concern:    Cary Cano was seen in my clinic on 10/21/2018. She may return to work on 10/23/18.    If you have any questions or concerns, please don't hesitate to call.        Sincerely,           Jasmine Obando P.A.-C.  Electronically Signed

## 2018-10-21 NOTE — PROGRESS NOTES
Chief Complaint   Patient presents with   • Painful Urination   • Fever   • Abdominal Pain       HISTORY OF PRESENT ILLNESS: Patient is a 43 y.o. female who presents today for the following:    Patient comes in for evaluation of dysuria that started yesterday and right flank and right lower quadrant pain that started a few days ago.  Patient has felt very feverish, nauseated with intermittent vomiting, and just overall feeling bad.  Patient has a history of UTI with high fever and flank pain.  Patient has been drinking some fluids but has not been eating very much.  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    There are no active problems to display for this patient.      Allergies:Patient has no known allergies.    Current Outpatient Prescriptions Ordered in [facility]   Medication Sig Dispense Refill   • omeprazole (PRILOSEC) 20 MG delayed-release capsule Take 20 mg by mouth every day.     • nitrofurantoin monohydr macro (MACROBID) 100 MG Cap Take 1 Cap by mouth every 12 hours for 5 days. 10 Cap 0   • fluconazole (DIFLUCAN) 150 MG tablet Take 1 Tab by mouth Once for 1 dose. 1 Tab 0   • ondansetron (ZOFRAN ODT) 4 MG TABLET DISPERSIBLE Take 1 Tab by mouth every 8 hours as needed for Nausea. 20 Tab 0   • phenazopyridine (PYRIDIUM) 200 MG Tab Take 1 Tab by mouth 3 times a day as needed. (Patient not taking: Reported on 10/21/2018) 6 Tab 0     Current Facility-Administered Medications Ordered in Epic   Medication Dose Route Frequency Provider Last Rate Last Dose   • ondansetron (ZOFRAN ODT) dispertab 4 mg  4 mg Oral Once MATT Paige.A.-C.           No past medical history on file.    Social History   Substance Use Topics   • Smoking status: Never Smoker   • Smokeless tobacco: Never Used   • Alcohol use Yes       No family status information on file.   No family history on file.    Review of Systems:   Constitutional ROS: No unexpected change in weight, No weakness, No  "fatigue  Eye ROS: No recent significant change in vision, No eye pain, redness, discharge  Ear ROS: No drainage, No tinnitus or vertigo, No recent change in hearing  Mouth/Throat ROS: No teeth or gum problems, No bleeding gums, No tongue complaints  Neck ROS: No swollen glands, No significant pain in neck  Pulmonary ROS: No chronic cough, sputum, or hemoptysis, No dyspnea on exertion, No wheezing  Cardiovascular ROS: No diaphoresis, No edema, No palpitations  Musculoskeletal/Extremities ROS: No peripheral edema, No pain, redness or swelling on the joints  Hematologic/Lymphatic ROS: No chills, No night sweats, No weight loss  Skin/Integumentary ROS: No edema, No evidence of rash, No itching      Exam:  Blood pressure 120/76, pulse 86, temperature 37.3 °C (99.2 °F), temperature source Temporal, resp. rate 16, height 1.676 m (5' 6\"), weight 64.9 kg (143 lb 1.3 oz), last menstrual period 10/07/2018, SpO2 98 %, not currently breastfeeding.  General: Well developed, well nourished. No distress.  HENT: Head is grossly normal.  Pulmonary: Unlabored respiratory effort. Lungs clear to auscultation, no wheezes, no rhonchi.  Cardiovascular: Regular rate and rhythm without murmur.   Back: No CVA tenderness noted.  Right flank discomfort noted.  Neurologic: Grossly nonfocal. No facial asymmetry noted.  Lymph: No cervical lymphadenopathy noted.  Skin: Warm, dry, good turgor. No rashes in visible areas.   Psych: Normal mood. Alert and oriented x3. Judgment and insight is normal.    Component Results     Component Value Ref Range & Units Status   POC Color yellow  Negative Final   POC Appearance clear  Negative Final   POC Leukocyte Esterase small  Negative Final   POC Nitrites negative  Negative Final   POC Urobiligen 0.2  Negative (0.2) mg/dL Final   POC Protein negative  Negative mg/dL Final   POC Urine PH 6.0  5.0 - 8.0 Final   POC Blood trace  Negative Final   POC Specific Gravity 1.030  <1.005 - >1.030 Final   POC Ketones 15  " Negative mg/dL Final   POC Bilirubin negative  Negative mg/dL Final   POC Glucose negative  Negative mg/dL Final       Assessment/Plan:  Take all medication as directed.  Drink plenty fluids.  Will contact patient with culture results.  Follow-up for worsening or persistent symptoms.  1. Acute cystitis with hematuria  nitrofurantoin monohydr macro (MACROBID) 100 MG Cap   2. Painful urination  POCT Urinalysis   3. Dysuria     4. Urinary frequency     5. Urinary urgency     6. Nausea and vomiting, intractability of vomiting not specified, unspecified vomiting type  ondansetron (ZOFRAN ODT) 4 MG TABLET DISPERSIBLE    ondansetron (ZOFRAN ODT) dispertab 4 mg   7. Antibiotic-induced yeast infection  fluconazole (DIFLUCAN) 150 MG tablet

## 2018-12-19 ENCOUNTER — APPOINTMENT (RX ONLY)
Dept: URBAN - NONMETROPOLITAN AREA CLINIC 15 | Facility: CLINIC | Age: 43
Setting detail: DERMATOLOGY
End: 2018-12-19

## 2018-12-19 DIAGNOSIS — L81.4 OTHER MELANIN HYPERPIGMENTATION: ICD-10-CM

## 2018-12-19 DIAGNOSIS — L84 CORNS AND CALLOSITIES: ICD-10-CM

## 2018-12-19 DIAGNOSIS — D22 MELANOCYTIC NEVI: ICD-10-CM

## 2018-12-19 DIAGNOSIS — D18.0 HEMANGIOMA: ICD-10-CM

## 2018-12-19 PROBLEM — D48.5 NEOPLASM OF UNCERTAIN BEHAVIOR OF SKIN: Status: ACTIVE | Noted: 2018-12-19

## 2018-12-19 PROBLEM — J45.909 UNSPECIFIED ASTHMA, UNCOMPLICATED: Status: ACTIVE | Noted: 2018-12-19

## 2018-12-19 PROBLEM — D18.01 HEMANGIOMA OF SKIN AND SUBCUTANEOUS TISSUE: Status: ACTIVE | Noted: 2018-12-19

## 2018-12-19 PROBLEM — D22.62 MELANOCYTIC NEVI OF LEFT UPPER LIMB, INCLUDING SHOULDER: Status: ACTIVE | Noted: 2018-12-19

## 2018-12-19 PROBLEM — D23.72 OTHER BENIGN NEOPLASM OF SKIN OF LEFT LOWER LIMB, INCLUDING HIP: Status: ACTIVE | Noted: 2018-12-19

## 2018-12-19 PROBLEM — D22.72 MELANOCYTIC NEVI OF LEFT LOWER LIMB, INCLUDING HIP: Status: ACTIVE | Noted: 2018-12-19

## 2018-12-19 PROBLEM — D22.71 MELANOCYTIC NEVI OF RIGHT LOWER LIMB, INCLUDING HIP: Status: ACTIVE | Noted: 2018-12-19

## 2018-12-19 PROBLEM — D22.5 MELANOCYTIC NEVI OF TRUNK: Status: ACTIVE | Noted: 2018-12-19

## 2018-12-19 PROBLEM — K21.9 GASTRO-ESOPHAGEAL REFLUX DISEASE WITHOUT ESOPHAGITIS: Status: ACTIVE | Noted: 2018-12-19

## 2018-12-19 PROBLEM — D22.61 MELANOCYTIC NEVI OF RIGHT UPPER LIMB, INCLUDING SHOULDER: Status: ACTIVE | Noted: 2018-12-19

## 2018-12-19 PROCEDURE — ? BIOPSY BY SHAVE METHOD

## 2018-12-19 PROCEDURE — 99203 OFFICE O/P NEW LOW 30 MIN: CPT | Mod: 25

## 2018-12-19 PROCEDURE — 11100: CPT

## 2018-12-19 PROCEDURE — ? COUNSELING

## 2018-12-19 ASSESSMENT — LOCATION DETAILED DESCRIPTION DERM
LOCATION DETAILED: LEFT ANTERIOR PROXIMAL UPPER ARM
LOCATION DETAILED: RIGHT POPLITEAL SKIN
LOCATION DETAILED: LEFT DISTAL POSTERIOR THIGH
LOCATION DETAILED: LEFT ANTERIOR DISTAL UPPER ARM
LOCATION DETAILED: XIPHOID
LOCATION DETAILED: LEFT PLANTAR FOREFOOT OVERLYING 5TH METATARSAL
LOCATION DETAILED: LEFT INFERIOR LATERAL MIDBACK
LOCATION DETAILED: RIGHT INFERIOR CENTRAL MALAR CHEEK
LOCATION DETAILED: RIGHT ANTERIOR DISTAL UPPER ARM
LOCATION DETAILED: RIGHT DISTAL POSTERIOR THIGH
LOCATION DETAILED: RIGHT MEDIAL SUPERIOR CHEST
LOCATION DETAILED: LEFT POPLITEAL SKIN
LOCATION DETAILED: LEFT INFERIOR MEDIAL UPPER BACK
LOCATION DETAILED: RIGHT VENTRAL PROXIMAL FOREARM
LOCATION DETAILED: SUBXIPHOID
LOCATION DETAILED: INFERIOR THORACIC SPINE
LOCATION DETAILED: RIGHT ANTERIOR PROXIMAL UPPER ARM
LOCATION DETAILED: RIGHT PLANTAR FOREFOOT OVERLYING 4TH METATARSAL
LOCATION DETAILED: LEFT LATERAL 2ND TOE
LOCATION DETAILED: LEFT PLANTAR FOREFOOT OVERLYING 2ND METATARSAL

## 2018-12-19 ASSESSMENT — LOCATION ZONE DERM
LOCATION ZONE: TRUNK
LOCATION ZONE: ARM
LOCATION ZONE: FACE
LOCATION ZONE: LEG
LOCATION ZONE: FEET
LOCATION ZONE: TOE

## 2018-12-19 ASSESSMENT — LOCATION SIMPLE DESCRIPTION DERM
LOCATION SIMPLE: RIGHT CHEEK
LOCATION SIMPLE: RIGHT POPLITEAL SKIN
LOCATION SIMPLE: LEFT LOWER BACK
LOCATION SIMPLE: LEFT POPLITEAL SKIN
LOCATION SIMPLE: LEFT PLANTAR SURFACE
LOCATION SIMPLE: UPPER BACK
LOCATION SIMPLE: RIGHT PLANTAR SURFACE
LOCATION SIMPLE: LEFT UPPER BACK
LOCATION SIMPLE: RIGHT POSTERIOR THIGH
LOCATION SIMPLE: LEFT UPPER ARM
LOCATION SIMPLE: ABDOMEN
LOCATION SIMPLE: RIGHT FOREARM
LOCATION SIMPLE: CHEST
LOCATION SIMPLE: RIGHT UPPER ARM
LOCATION SIMPLE: LEFT POSTERIOR THIGH
LOCATION SIMPLE: LEFT 2ND TOE

## 2018-12-19 NOTE — PROCEDURE: BIOPSY BY SHAVE METHOD
Destruction After The Procedure: No
Depth Of Biopsy: dermis
Consent: Written consent was obtained and risks were reviewed including but not limited to scarring, infection, bleeding, scabbing, incomplete removal, nerve damage and allergy to anesthesia.
Lab Facility: 
Cryotherapy Text: The wound bed was treated with cryotherapy after the biopsy was performed.
X Size Of Lesion In Cm: 0
Biopsy Type: H and E
Electrodesiccation Text: The wound bed was treated with electrodesiccation after the biopsy was performed.
Wound Care: Vaseline
Billing Type: Third-Party Bill
Electrodesiccation And Curettage Text: The wound bed was treated with electrodesiccation and curettage after the biopsy was performed.
Type Of Destruction Used: Electrodesiccation
Was A Bandage Applied: Yes
Anesthesia Volume In Cc: 0.5
Dressing: bandage
Detail Level: Detailed
Biopsy Method: Personna blade
Post-Care Instructions: I reviewed with the patient in detail post-care instructions. Patient is to keep the biopsy site dry overnight, and then apply bacitracin twice daily until healed. Patient may apply hydrogen peroxide soaks to remove any crusting.
Notification Instructions: Patient will be notified of biopsy results. However, patient instructed to call the office if not contacted within 2 weeks.
Hemostasis: Electrocautery
Silver Nitrate Text: The wound bed was treated with silver nitrate after the biopsy was performed.
Lab: [id number]
Curettage Text: The wound bed was treated with curettage after the biopsy was performed.
Size Of Lesion In Cm: 0.6
Anesthesia Type: 1% lidocaine with epinephrine

## 2020-03-13 ENCOUNTER — OFFICE VISIT (OUTPATIENT)
Dept: URGENT CARE | Facility: PHYSICIAN GROUP | Age: 45
End: 2020-03-13
Payer: COMMERCIAL

## 2020-03-13 ENCOUNTER — HOSPITAL ENCOUNTER (OUTPATIENT)
Facility: MEDICAL CENTER | Age: 45
End: 2020-03-13
Attending: PHYSICIAN ASSISTANT
Payer: COMMERCIAL

## 2020-03-13 VITALS
OXYGEN SATURATION: 97 % | WEIGHT: 152 LBS | HEART RATE: 88 BPM | SYSTOLIC BLOOD PRESSURE: 126 MMHG | HEIGHT: 66 IN | RESPIRATION RATE: 16 BRPM | DIASTOLIC BLOOD PRESSURE: 88 MMHG | BODY MASS INDEX: 24.43 KG/M2 | TEMPERATURE: 97.4 F

## 2020-03-13 DIAGNOSIS — T36.95XA ANTIBIOTIC-INDUCED YEAST INFECTION: ICD-10-CM

## 2020-03-13 DIAGNOSIS — N30.01 ACUTE CYSTITIS WITH HEMATURIA: ICD-10-CM

## 2020-03-13 DIAGNOSIS — B37.9 ANTIBIOTIC-INDUCED YEAST INFECTION: ICD-10-CM

## 2020-03-13 DIAGNOSIS — R30.0 DYSURIA: ICD-10-CM

## 2020-03-13 LAB
APPEARANCE UR: NORMAL
BILIRUB UR STRIP-MCNC: NORMAL MG/DL
COLOR UR AUTO: NORMAL
GLUCOSE UR STRIP.AUTO-MCNC: NORMAL MG/DL
KETONES UR STRIP.AUTO-MCNC: 15 MG/DL
LEUKOCYTE ESTERASE UR QL STRIP.AUTO: NORMAL
NITRITE UR QL STRIP.AUTO: POSITIVE
PH UR STRIP.AUTO: 5 [PH] (ref 5–8)
PROT UR QL STRIP: NORMAL MG/DL
RBC UR QL AUTO: NORMAL
SP GR UR STRIP.AUTO: 1.01
UROBILINOGEN UR STRIP-MCNC: NORMAL MG/DL

## 2020-03-13 PROCEDURE — 99214 OFFICE O/P EST MOD 30 MIN: CPT | Performed by: PHYSICIAN ASSISTANT

## 2020-03-13 PROCEDURE — 81002 URINALYSIS NONAUTO W/O SCOPE: CPT | Performed by: PHYSICIAN ASSISTANT

## 2020-03-13 PROCEDURE — 87086 URINE CULTURE/COLONY COUNT: CPT

## 2020-03-13 RX ORDER — PHENAZOPYRIDINE HYDROCHLORIDE 200 MG/1
200 TABLET, FILM COATED ORAL 3 TIMES DAILY
Qty: 6 TAB | Refills: 0 | Status: SHIPPED | OUTPATIENT
Start: 2020-03-13 | End: 2020-03-15

## 2020-03-13 RX ORDER — NITROFURANTOIN 25; 75 MG/1; MG/1
100 CAPSULE ORAL EVERY 12 HOURS
Qty: 10 CAP | Refills: 0 | Status: SHIPPED | OUTPATIENT
Start: 2020-03-13 | End: 2020-03-18

## 2020-03-13 RX ORDER — FLUCONAZOLE 150 MG/1
150 TABLET ORAL DAILY
Qty: 1 TAB | Refills: 0 | Status: SHIPPED | OUTPATIENT
Start: 2020-03-13 | End: 2023-11-14

## 2020-03-13 NOTE — PROGRESS NOTES
Chief Complaint   Patient presents with   • Flank Pain       HISTORY OF PRESENT ILLNESS: Patient is a 44 y.o. female who presents today because she has a 2 to 3-day history of right flank pain, pain with urination.  She did try some over-the-counter medications without improvement.  Reports fevers and chills, but does not have any today.    There are no active problems to display for this patient.      Allergies:Patient has no known allergies.    Current Outpatient Medications Ordered in Epic   Medication Sig Dispense Refill   • nitrofurantoin (MACROBID) 100 MG Cap Take 1 Cap by mouth every 12 hours for 5 days. 10 Cap 0   • phenazopyridine (PYRIDIUM) 200 MG Tab Take 1 Tab by mouth 3 times a day for 2 days. 6 Tab 0   • omeprazole (PRILOSEC) 20 MG delayed-release capsule Take 20 mg by mouth every day.     • ondansetron (ZOFRAN ODT) 4 MG TABLET DISPERSIBLE Take 1 Tab by mouth every 8 hours as needed for Nausea. 20 Tab 0   • phenazopyridine (PYRIDIUM) 200 MG Tab Take 1 Tab by mouth 3 times a day as needed. (Patient not taking: Reported on 10/21/2018) 6 Tab 0     No current Epic-ordered facility-administered medications on file.        History reviewed. No pertinent past medical history.    Social History     Tobacco Use   • Smoking status: Never Smoker   • Smokeless tobacco: Never Used   Substance Use Topics   • Alcohol use: Yes   • Drug use: No       No family status information on file.   History reviewed. No pertinent family history.    ROS:  Review of Systems   Constitutional: Positive for resolved fever, no chills, weight loss and malaise/fatigue.   HENT: Negative for ear pain, nosebleeds, congestion, sore throat and neck pain.    Eyes: Negative for blurred vision.   Respiratory: Negative for cough, sputum production, shortness of breath and wheezing.    Cardiovascular: Negative for chest pain, palpitations, orthopnea and leg swelling.   Gastrointestinal: Negative for heartburn, nausea, vomiting and abdominal pain.  "  Genitourinary: Positive for dysuria, urgency and frequency.     Exam:  /88 (BP Location: Right arm, Patient Position: Sitting, BP Cuff Size: Adult)   Pulse 88   Temp 36.3 °C (97.4 °F) (Temporal)   Resp 16   Ht 1.676 m (5' 6\")   Wt 68.9 kg (152 lb)   SpO2 97%   General:  Well nourished, well developed female in NAD  Head:Normocephalic, atraumatic  Eyes: PERRLA, EOM within normal limits, no conjunctival injection, no scleral icterus, visual fields and acuity grossly intact.  Nose: Symmetrical without tenderness, no discharge.  Mouth: reasonable hygiene, no erythema exudates or tonsillar enlargement.  Neck: no masses, range of motion within normal limits, no tracheal deviation. No obvious thyroid enlargement.  Extremities: no clubbing, cyanosis, or edema.    Urinalysis has trace leuks, positive nitrites, trace blood.    Please note that this dictation was created using voice recognition software. I have made every reasonable attempt to correct obvious errors, but I expect that there are errors of grammar and possibly content that I did not discover before finalizing the note.    Assessment/Plan:  1. Dysuria  phenazopyridine (PYRIDIUM) 200 MG Tab   2. Acute cystitis with hematuria  POCT Urinalysis    Urine Culture    nitrofurantoin (MACROBID) 100 MG Cap   Increase p.o. fluids  Followup with primary care in the next 7-10 days if not significantly improving, return to the urgent care or go to the emergency room sooner for any worsening of symptoms.       "

## 2020-03-16 LAB
BACTERIA UR CULT: NORMAL
SIGNIFICANT IND 70042: NORMAL
SITE SITE: NORMAL
SOURCE SOURCE: NORMAL

## 2020-09-16 ENCOUNTER — OFFICE VISIT (OUTPATIENT)
Dept: URGENT CARE | Facility: PHYSICIAN GROUP | Age: 45
End: 2020-09-16
Payer: COMMERCIAL

## 2020-09-16 VITALS
TEMPERATURE: 97.9 F | SYSTOLIC BLOOD PRESSURE: 118 MMHG | HEART RATE: 74 BPM | BODY MASS INDEX: 24.91 KG/M2 | WEIGHT: 155 LBS | DIASTOLIC BLOOD PRESSURE: 82 MMHG | OXYGEN SATURATION: 99 % | HEIGHT: 66 IN

## 2020-09-16 DIAGNOSIS — L03.012 PARONYCHIA OF FINGER OF LEFT HAND: ICD-10-CM

## 2020-09-16 PROCEDURE — 99214 OFFICE O/P EST MOD 30 MIN: CPT | Performed by: PHYSICIAN ASSISTANT

## 2020-09-16 RX ORDER — CEPHALEXIN 500 MG/1
500 CAPSULE ORAL 3 TIMES DAILY
Qty: 30 CAP | Refills: 0 | Status: SHIPPED | OUTPATIENT
Start: 2020-09-16 | End: 2020-09-26

## 2020-09-16 NOTE — PROGRESS NOTES
Chief Complaint   Patient presents with   • Finger Pain     poss right index finger infection        HISTORY OF PRESENT ILLNESS: Patient is a 45 y.o. female who presents today because she cut her left index finger near the nail couple weeks ago and went to a local emergency room and had it repaired with Dermabond and Steri-Strips.  It was healing well up until couple days ago when it started to get red, tender and has a small amount of thick yellow drainage.  She has been using Neosporin on the area without any improvement    There are no active problems to display for this patient.      Allergies:Patient has no known allergies.    Current Outpatient Medications Ordered in Epic   Medication Sig Dispense Refill   • cephALEXin (KEFLEX) 500 MG Cap Take 1 Cap by mouth 3 times a day for 10 days. 30 Cap 0   • fluconazole (DIFLUCAN) 150 MG tablet Take 1 Tab by mouth every day. (Patient not taking: Reported on 9/16/2020) 1 Tab 0   • omeprazole (PRILOSEC) 20 MG delayed-release capsule Take 20 mg by mouth every day.     • ondansetron (ZOFRAN ODT) 4 MG TABLET DISPERSIBLE Take 1 Tab by mouth every 8 hours as needed for Nausea. (Patient not taking: Reported on 9/16/2020) 20 Tab 0   • phenazopyridine (PYRIDIUM) 200 MG Tab Take 1 Tab by mouth 3 times a day as needed. (Patient not taking: Reported on 10/21/2018) 6 Tab 0     No current Epic-ordered facility-administered medications on file.        History reviewed. No pertinent past medical history.    Social History     Tobacco Use   • Smoking status: Never Smoker   • Smokeless tobacco: Never Used   Substance Use Topics   • Alcohol use: Yes   • Drug use: No       No family status information on file.   History reviewed. No pertinent family history.    ROS:  Review of Systems   Constitutional: Negative for fever, chills, weight loss and malaise/fatigue.   HENT: Negative for ear pain, nosebleeds, congestion, sore throat and neck pain.    Eyes: Negative for blurred vision.  "  Respiratory: Negative for cough, sputum production, shortness of breath and wheezing.    Cardiovascular: Negative for chest pain, palpitations, orthopnea and leg swelling.   Gastrointestinal: Negative for heartburn, nausea, vomiting and abdominal pain.   Genitourinary: Negative for dysuria, urgency and frequency.     Exam:  /82 (BP Location: Right arm, Patient Position: Sitting, BP Cuff Size: Adult)   Pulse 74   Temp 36.6 °C (97.9 °F) (Temporal)   Ht 1.676 m (5' 6\")   Wt 70.3 kg (155 lb)   SpO2 99%   General:  Well nourished, well developed female in NAD  Head:Normocephalic, atraumatic  Eyes: PERRLA, EOM within normal limits, no conjunctival injection, no scleral icterus, visual fields and acuity grossly intact.  Nose: Symmetrical without tenderness, no discharge.  Mouth: reasonable hygiene, no erythema exudates or tonsillar enlargement.  Neck: no masses, range of motion within normal limits, no tracheal deviation. No obvious thyroid enlargement.  Extremities: no clubbing, cyanosis, or edema.  Left index finger has erythema, swelling and tenderness to the radial aspect along the nail edge.  There is dried thick yellow secretions about the area    Please note that this dictation was created using voice recognition software. I have made every reasonable attempt to correct obvious errors, but I expect that there are errors of grammar and possibly content that I did not discover before finalizing the note.    Assessment/Plan:  1. Paronychia of finger of left hand  cephALEXin (KEFLEX) 500 MG Cap   Wound care instructions given    Followup with primary care in the next 7-10 days if not significantly improving, return to the urgent care or go to the emergency room sooner for any worsening of symptoms.       "

## 2022-09-07 ENCOUNTER — OFFICE VISIT (OUTPATIENT)
Dept: URGENT CARE | Facility: PHYSICIAN GROUP | Age: 47
End: 2022-09-07
Payer: COMMERCIAL

## 2022-09-07 VITALS
HEART RATE: 71 BPM | HEIGHT: 66 IN | TEMPERATURE: 97.9 F | DIASTOLIC BLOOD PRESSURE: 68 MMHG | OXYGEN SATURATION: 96 % | BODY MASS INDEX: 26.52 KG/M2 | RESPIRATION RATE: 16 BRPM | SYSTOLIC BLOOD PRESSURE: 114 MMHG | WEIGHT: 165 LBS

## 2022-09-07 DIAGNOSIS — B37.0 ORAL THRUSH: ICD-10-CM

## 2022-09-07 PROCEDURE — 99213 OFFICE O/P EST LOW 20 MIN: CPT | Performed by: PHYSICIAN ASSISTANT

## 2022-09-07 RX ORDER — ALBUTEROL SULFATE 90 UG/1
2 AEROSOL, METERED RESPIRATORY (INHALATION) EVERY 6 HOURS PRN
COMMUNITY

## 2022-09-07 ASSESSMENT — ENCOUNTER SYMPTOMS
CHILLS: 0
FEVER: 0
COUGH: 0
SORE THROAT: 0
SINUS PAIN: 0
DIZZINESS: 0
MYALGIAS: 0
HEADACHES: 0

## 2022-09-07 NOTE — PROGRESS NOTES
Subjective:     CHIEF COMPLAINT  Chief Complaint   Patient presents with    Thrush       HPI  Cary Cano is a very pleasant 46 y.o. female who presents to the clinic with concerns about potential oral thrush.  Patient states she has noticed a painful white tongue intermittently over the last month.  States she has been under a lot of stress at work which she believes may have triggered this.  She used to suffer from thrush in the past and states this feels similar.  Denies any recent antibiotic use.  No steroid inhaler.  No tobacco use.  History of diabetes.  Denies any difficulty or painful swallowing.  No fevers, chills or myalgias.    REVIEW OF SYSTEMS  Review of Systems   Constitutional:  Negative for chills, fever and malaise/fatigue.   HENT:  Negative for congestion, sinus pain and sore throat.         White plaques in mouth.  Occasional painful tongue.   Respiratory:  Negative for cough.    Musculoskeletal:  Negative for myalgias.   Skin:  Negative for rash.   Neurological:  Negative for dizziness and headaches.     PAST MEDICAL HISTORY  There are no problems to display for this patient.      SURGICAL HISTORY  patient denies any surgical history    ALLERGIES  No Known Allergies    CURRENT MEDICATIONS  Home Medications       Reviewed by Henrique Garcia P.A.-C. (Physician Assistant) on 09/07/22 at 1211  Med List Status: <None>     Medication Last Dose Status   albuterol 108 (90 Base) MCG/ACT Aero Soln inhalation aerosol Taking Active   fluconazole (DIFLUCAN) 150 MG tablet Not Taking Flagged for Removal   omeprazole (PRILOSEC) 20 MG delayed-release capsule Taking Active   ondansetron (ZOFRAN ODT) 4 MG TABLET DISPERSIBLE Not Taking Flagged for Removal   phenazopyridine (PYRIDIUM) 200 MG Tab Not Taking Flagged for Removal                    SOCIAL HISTORY  Social History     Tobacco Use    Smoking status: Never    Smokeless tobacco: Never   Substance and Sexual Activity    Alcohol use: Yes    Drug use: No     "Sexual activity: Not on file       FAMILY HISTORY  History reviewed. No pertinent family history.       Objective:     VITAL SIGNS: /68   Pulse 71   Temp 36.6 °C (97.9 °F) (Temporal)   Resp 16   Ht 1.676 m (5' 6\")   Wt 74.8 kg (165 lb)   SpO2 96%   BMI 26.63 kg/m²     PHYSICAL EXAM  Physical Exam  Constitutional:       General: She is not in acute distress.     Appearance: Normal appearance. She is not ill-appearing, toxic-appearing or diaphoretic.   HENT:      Head: Normocephalic and atraumatic.      Nose: Nose normal.      Mouth/Throat:      Mouth: Mucous membranes are moist.      Pharynx: No oropharyngeal exudate, posterior oropharyngeal erythema or uvula swelling.      Tonsils: No tonsillar exudate or tonsillar abscesses.      Comments: Patient's tongue has a thin white plaque on the surface that is able to be removed with a tongue depressor.  White plaques present on the buccal mucosa bilaterally.  Eyes:      Conjunctiva/sclera: Conjunctivae normal.   Cardiovascular:      Pulses: Normal pulses.   Pulmonary:      Effort: Pulmonary effort is normal.   Musculoskeletal:      Cervical back: Normal range of motion.   Lymphadenopathy:      Cervical: No cervical adenopathy.   Neurological:      General: No focal deficit present.      Mental Status: She is alert. Mental status is at baseline.       Assessment/Plan:     1. Oral thrush    - nystatin (MYCOSTATIN) 259749 UNIT/ML Suspension; Take 5 mL by mouth 4 times a day for 14 days.  Dispense: 280 mL; Refill: 1      MDM/Comments:    Oral nystatin as directed.  Follow-up in clinic for any persistence or worsening of symptoms.    Differential diagnosis, natural history, supportive care, and indications for immediate follow-up discussed. All questions answered. Patient agrees with the plan of care.    Follow-up as needed if symptoms worsen or fail to improve to PCP, Urgent care or Emergency Room.    I have personally reviewed prior external notes and test " results pertinent to today's visit.  I have independently reviewed and interpreted all diagnostics ordered during this urgent care acute visit.   Discussed management options (risks,benefits, and alternatives to treatment). Pt expresses understanding and the treatment plan was agreed upon. Questions were encouraged and answered to pt's satisfaction.    Please note that this dictation was created using voice recognition software. I have made a reasonable attempt to correct obvious errors, but I expect that there are errors of grammar and possibly content that I did not discover before finalizing the note.

## 2022-09-26 ENCOUNTER — HOSPITAL ENCOUNTER (OUTPATIENT)
Dept: RADIOLOGY | Facility: MEDICAL CENTER | Age: 47
End: 2022-09-26
Attending: INTERNAL MEDICINE
Payer: COMMERCIAL

## 2022-09-26 DIAGNOSIS — Z12.31 VISIT FOR SCREENING MAMMOGRAM: ICD-10-CM

## 2022-09-26 PROCEDURE — 77063 BREAST TOMOSYNTHESIS BI: CPT

## 2023-04-20 ENCOUNTER — HOSPITAL ENCOUNTER (OUTPATIENT)
Dept: LAB | Facility: MEDICAL CENTER | Age: 48
End: 2023-04-20
Attending: CHIROPRACTOR
Payer: COMMERCIAL

## 2023-11-14 ENCOUNTER — APPOINTMENT (OUTPATIENT)
Dept: MEDICAL GROUP | Facility: PHYSICIAN GROUP | Age: 48
End: 2023-11-14
Payer: COMMERCIAL

## 2023-11-14 DIAGNOSIS — Z23 NEED FOR VACCINATION: ICD-10-CM

## 2024-07-25 ENCOUNTER — OFFICE VISIT (OUTPATIENT)
Dept: MEDICAL GROUP | Facility: IMAGING CENTER | Age: 49
End: 2024-07-25
Payer: COMMERCIAL

## 2024-07-25 VITALS
HEART RATE: 65 BPM | BODY MASS INDEX: 29.41 KG/M2 | TEMPERATURE: 98.6 F | DIASTOLIC BLOOD PRESSURE: 70 MMHG | OXYGEN SATURATION: 96 % | HEIGHT: 66 IN | RESPIRATION RATE: 15 BRPM | WEIGHT: 183 LBS | SYSTOLIC BLOOD PRESSURE: 118 MMHG

## 2024-07-25 DIAGNOSIS — R11.0 NAUSEA: ICD-10-CM

## 2024-07-25 DIAGNOSIS — Z11.4 SCREENING FOR HIV (HUMAN IMMUNODEFICIENCY VIRUS): ICD-10-CM

## 2024-07-25 DIAGNOSIS — E66.3 OVERWEIGHT (BMI 25.0-29.9): ICD-10-CM

## 2024-07-25 DIAGNOSIS — F41.9 ANXIETY: ICD-10-CM

## 2024-07-25 DIAGNOSIS — Z97.5 IUD (INTRAUTERINE DEVICE) IN PLACE: ICD-10-CM

## 2024-07-25 DIAGNOSIS — Z01.84 IMMUNITY STATUS TESTING: ICD-10-CM

## 2024-07-25 DIAGNOSIS — Z11.59 NEED FOR HEPATITIS C SCREENING TEST: ICD-10-CM

## 2024-07-25 DIAGNOSIS — J45.20 MILD INTERMITTENT ASTHMA WITHOUT COMPLICATION: ICD-10-CM

## 2024-07-25 DIAGNOSIS — N95.1 PERIMENOPAUSE: ICD-10-CM

## 2024-07-25 DIAGNOSIS — F33.2 SEVERE EPISODE OF RECURRENT MAJOR DEPRESSIVE DISORDER, WITHOUT PSYCHOTIC FEATURES (HCC): ICD-10-CM

## 2024-07-25 DIAGNOSIS — R19.7 DIARRHEA, UNSPECIFIED TYPE: ICD-10-CM

## 2024-07-25 DIAGNOSIS — Z13.21 ENCOUNTER FOR VITAMIN DEFICIENCY SCREENING: ICD-10-CM

## 2024-07-25 PROCEDURE — 3078F DIAST BP <80 MM HG: CPT | Performed by: STUDENT IN AN ORGANIZED HEALTH CARE EDUCATION/TRAINING PROGRAM

## 2024-07-25 PROCEDURE — 99214 OFFICE O/P EST MOD 30 MIN: CPT | Performed by: STUDENT IN AN ORGANIZED HEALTH CARE EDUCATION/TRAINING PROGRAM

## 2024-07-25 PROCEDURE — 3074F SYST BP LT 130 MM HG: CPT | Performed by: STUDENT IN AN ORGANIZED HEALTH CARE EDUCATION/TRAINING PROGRAM

## 2024-07-25 RX ORDER — ALBUTEROL SULFATE 90 UG/1
2 AEROSOL, METERED RESPIRATORY (INHALATION) EVERY 6 HOURS PRN
Qty: 8.5 G | Refills: 3 | Status: SHIPPED | OUTPATIENT
Start: 2024-07-25

## 2024-07-25 ASSESSMENT — PATIENT HEALTH QUESTIONNAIRE - PHQ9
5. POOR APPETITE OR OVEREATING: 2 - MORE THAN HALF THE DAYS
CLINICAL INTERPRETATION OF PHQ2 SCORE: 2
SUM OF ALL RESPONSES TO PHQ QUESTIONS 1-9: 18

## 2024-07-25 ASSESSMENT — ANXIETY QUESTIONNAIRES
GAD7 TOTAL SCORE: 21
3. WORRYING TOO MUCH ABOUT DIFFERENT THINGS: NEARLY EVERY DAY
4. TROUBLE RELAXING: NEARLY EVERY DAY
5. BEING SO RESTLESS THAT IT IS HARD TO SIT STILL: NEARLY EVERY DAY
1. FEELING NERVOUS, ANXIOUS, OR ON EDGE: NEARLY EVERY DAY
2. NOT BEING ABLE TO STOP OR CONTROL WORRYING: NEARLY EVERY DAY
7. FEELING AFRAID AS IF SOMETHING AWFUL MIGHT HAPPEN: NEARLY EVERY DAY
6. BECOMING EASILY ANNOYED OR IRRITABLE: NEARLY EVERY DAY

## 2024-08-05 ENCOUNTER — APPOINTMENT (OUTPATIENT)
Dept: MEDICAL GROUP | Facility: IMAGING CENTER | Age: 49
End: 2024-08-05
Payer: COMMERCIAL

## 2024-09-06 ENCOUNTER — HOSPITAL ENCOUNTER (OUTPATIENT)
Dept: LAB | Facility: MEDICAL CENTER | Age: 49
End: 2024-09-06
Attending: STUDENT IN AN ORGANIZED HEALTH CARE EDUCATION/TRAINING PROGRAM
Payer: COMMERCIAL

## 2024-09-06 DIAGNOSIS — Z11.59 NEED FOR HEPATITIS C SCREENING TEST: ICD-10-CM

## 2024-09-06 DIAGNOSIS — E66.3 OVERWEIGHT (BMI 25.0-29.9): ICD-10-CM

## 2024-09-06 DIAGNOSIS — Z11.4 SCREENING FOR HIV (HUMAN IMMUNODEFICIENCY VIRUS): ICD-10-CM

## 2024-09-06 DIAGNOSIS — Z01.84 IMMUNITY STATUS TESTING: ICD-10-CM

## 2024-09-06 DIAGNOSIS — N95.1 PERIMENOPAUSE: ICD-10-CM

## 2024-09-06 LAB
25(OH)D3 SERPL-MCNC: 32 NG/ML (ref 30–100)
ALBUMIN SERPL BCP-MCNC: 4.2 G/DL (ref 3.2–4.9)
ALBUMIN/GLOB SERPL: 1.6 G/DL
ALP SERPL-CCNC: 65 U/L (ref 30–99)
ALT SERPL-CCNC: 29 U/L (ref 2–50)
ANION GAP SERPL CALC-SCNC: 12 MMOL/L (ref 7–16)
AST SERPL-CCNC: 27 U/L (ref 12–45)
BILIRUB SERPL-MCNC: 0.5 MG/DL (ref 0.1–1.5)
BUN SERPL-MCNC: 11 MG/DL (ref 8–22)
CALCIUM ALBUM COR SERPL-MCNC: 8.7 MG/DL (ref 8.5–10.5)
CALCIUM SERPL-MCNC: 8.9 MG/DL (ref 8.5–10.5)
CHLORIDE SERPL-SCNC: 102 MMOL/L (ref 96–112)
CHOLEST SERPL-MCNC: 226 MG/DL (ref 100–199)
CO2 SERPL-SCNC: 28 MMOL/L (ref 20–33)
CREAT SERPL-MCNC: 0.99 MG/DL (ref 0.5–1.4)
EST. AVERAGE GLUCOSE BLD GHB EST-MCNC: 97 MG/DL
FASTING STATUS PATIENT QL REPORTED: NORMAL
FSH SERPL-ACNC: 4.3 MIU/ML
GFR SERPLBLD CREATININE-BSD FMLA CKD-EPI: 70 ML/MIN/1.73 M 2
GLOBULIN SER CALC-MCNC: 2.6 G/DL (ref 1.9–3.5)
GLUCOSE SERPL-MCNC: 77 MG/DL (ref 65–99)
HBA1C MFR BLD: 5 % (ref 4–5.6)
HBV SURFACE AB SERPL IA-ACNC: <3.5 MIU/ML (ref 0–10)
HCV AB SER QL: NORMAL
HDLC SERPL-MCNC: 44 MG/DL
HIV 1+2 AB+HIV1 P24 AG SERPL QL IA: NORMAL
LDLC SERPL CALC-MCNC: 156 MG/DL
LH SERPL-ACNC: 5.1 IU/L
POTASSIUM SERPL-SCNC: 3.8 MMOL/L (ref 3.6–5.5)
PROGEST SERPL-MCNC: 6.45 NG/ML
PROLACTIN SERPL-MCNC: 7.03 NG/ML (ref 2.8–26)
PROT SERPL-MCNC: 6.8 G/DL (ref 6–8.2)
SODIUM SERPL-SCNC: 142 MMOL/L (ref 135–145)
TRIGL SERPL-MCNC: 129 MG/DL (ref 0–149)
TSH SERPL DL<=0.005 MIU/L-ACNC: 3.12 UIU/ML (ref 0.38–5.33)

## 2024-09-06 PROCEDURE — 86803 HEPATITIS C AB TEST: CPT

## 2024-09-06 PROCEDURE — 85027 COMPLETE CBC AUTOMATED: CPT

## 2024-09-06 PROCEDURE — 84402 ASSAY OF FREE TESTOSTERONE: CPT

## 2024-09-06 PROCEDURE — 83001 ASSAY OF GONADOTROPIN (FSH): CPT

## 2024-09-06 PROCEDURE — 86706 HEP B SURFACE ANTIBODY: CPT

## 2024-09-06 PROCEDURE — 82671 ASSAY OF ESTROGENS: CPT

## 2024-09-06 PROCEDURE — 82306 VITAMIN D 25 HYDROXY: CPT

## 2024-09-06 PROCEDURE — 80061 LIPID PANEL: CPT

## 2024-09-06 PROCEDURE — 36415 COLL VENOUS BLD VENIPUNCTURE: CPT

## 2024-09-06 PROCEDURE — 84146 ASSAY OF PROLACTIN: CPT

## 2024-09-06 PROCEDURE — 84443 ASSAY THYROID STIM HORMONE: CPT

## 2024-09-06 PROCEDURE — 84270 ASSAY OF SEX HORMONE GLOBUL: CPT

## 2024-09-06 PROCEDURE — 84403 ASSAY OF TOTAL TESTOSTERONE: CPT

## 2024-09-06 PROCEDURE — 83036 HEMOGLOBIN GLYCOSYLATED A1C: CPT

## 2024-09-06 PROCEDURE — 83002 ASSAY OF GONADOTROPIN (LH): CPT

## 2024-09-06 PROCEDURE — 87389 HIV-1 AG W/HIV-1&-2 AB AG IA: CPT

## 2024-09-06 PROCEDURE — 85007 BL SMEAR W/DIFF WBC COUNT: CPT

## 2024-09-06 PROCEDURE — 80053 COMPREHEN METABOLIC PANEL: CPT

## 2024-09-06 PROCEDURE — 84144 ASSAY OF PROGESTERONE: CPT

## 2024-09-07 LAB
ANISOCYTOSIS BLD QL SMEAR: ABNORMAL
BASOPHILS # BLD AUTO: 10.9 % (ref 0–1.8)
BASOPHILS # BLD: 3.48 K/UL (ref 0–0.12)
COMMENT NL1176: NORMAL
EOSINOPHIL # BLD AUTO: 0.29 K/UL (ref 0–0.51)
EOSINOPHIL NFR BLD: 0.9 % (ref 0–6.9)
ERYTHROCYTE [DISTWIDTH] IN BLOOD BY AUTOMATED COUNT: 51.3 FL (ref 35.9–50)
HCT VFR BLD AUTO: 48.8 % (ref 37–47)
HGB BLD-MCNC: 15.6 G/DL (ref 12–16)
LYMPHOCYTES # BLD AUTO: 3.76 K/UL (ref 1–4.8)
LYMPHOCYTES NFR BLD: 11.8 % (ref 22–41)
MACROCYTES BLD QL SMEAR: ABNORMAL
MANUAL DIFF BLD: NORMAL
MCH RBC QN AUTO: 28.9 PG (ref 27–33)
MCHC RBC AUTO-ENTMCNC: 32 G/DL (ref 32.2–35.5)
MCV RBC AUTO: 90.5 FL (ref 81.4–97.8)
MONOCYTES # BLD AUTO: 0.54 K/UL (ref 0–0.85)
MONOCYTES NFR BLD AUTO: 1.7 % (ref 0–13.4)
MORPHOLOGY BLD-IMP: NORMAL
MYELOCYTES NFR BLD MANUAL: 2.5 %
NEUTROPHILS # BLD AUTO: 23.03 K/UL (ref 1.82–7.42)
NEUTROPHILS NFR BLD: 71.4 % (ref 44–72)
NEUTS BAND NFR BLD MANUAL: 0.8 % (ref 0–10)
NRBC # BLD AUTO: 0.05 K/UL
NRBC BLD-RTO: 0.2 /100 WBC (ref 0–0.2)
PLATELET # BLD AUTO: 752 K/UL (ref 164–446)
PLATELET BLD QL SMEAR: NORMAL
PMV BLD AUTO: 9.4 FL (ref 9–12.9)
RBC # BLD AUTO: 5.39 M/UL (ref 4.2–5.4)
RBC BLD AUTO: PRESENT
SMUDGE CELLS BLD QL SMEAR: NORMAL
WBC # BLD AUTO: 31.9 K/UL (ref 4.8–10.8)

## 2024-09-09 ENCOUNTER — TELEPHONE (OUTPATIENT)
Dept: MEDICAL GROUP | Facility: IMAGING CENTER | Age: 49
End: 2024-09-09
Payer: COMMERCIAL

## 2024-09-11 LAB
ESTRADIOL SERPL HS-MCNC: 152 PG/ML
ESTROGEN SERPL CALC-MCNC: 256.2 PG/ML
ESTRONE SERPL-MCNC: 104.2 PG/ML

## 2024-09-13 ENCOUNTER — APPOINTMENT (OUTPATIENT)
Dept: MEDICAL GROUP | Facility: IMAGING CENTER | Age: 49
End: 2024-09-13
Payer: COMMERCIAL

## 2024-09-13 VITALS
WEIGHT: 181.4 LBS | DIASTOLIC BLOOD PRESSURE: 62 MMHG | BODY MASS INDEX: 29.15 KG/M2 | SYSTOLIC BLOOD PRESSURE: 112 MMHG | HEIGHT: 66 IN | RESPIRATION RATE: 16 BRPM | OXYGEN SATURATION: 96 % | HEART RATE: 83 BPM | TEMPERATURE: 98.8 F

## 2024-09-13 DIAGNOSIS — E78.5 DYSLIPIDEMIA: ICD-10-CM

## 2024-09-13 DIAGNOSIS — Z71.2 ENCOUNTER TO DISCUSS TEST RESULTS: ICD-10-CM

## 2024-09-13 DIAGNOSIS — D72.829 LEUKOCYTOSIS, UNSPECIFIED TYPE: ICD-10-CM

## 2024-09-13 DIAGNOSIS — Z80.6 FAMILY HISTORY OF LEUKEMIA: ICD-10-CM

## 2024-09-13 DIAGNOSIS — N95.1 PERIMENOPAUSE: ICD-10-CM

## 2024-09-13 PROCEDURE — 99214 OFFICE O/P EST MOD 30 MIN: CPT | Performed by: STUDENT IN AN ORGANIZED HEALTH CARE EDUCATION/TRAINING PROGRAM

## 2024-09-13 PROCEDURE — 3074F SYST BP LT 130 MM HG: CPT | Performed by: STUDENT IN AN ORGANIZED HEALTH CARE EDUCATION/TRAINING PROGRAM

## 2024-09-13 PROCEDURE — 3078F DIAST BP <80 MM HG: CPT | Performed by: STUDENT IN AN ORGANIZED HEALTH CARE EDUCATION/TRAINING PROGRAM

## 2024-09-13 ASSESSMENT — FIBROSIS 4 INDEX: FIB4 SCORE: 0.32

## 2024-09-13 NOTE — PROGRESS NOTES
"Subjective:     CC:   Chief Complaint   Patient presents with    Lab Results     Labs from 9/6       HPI:   sofia Garner, 49 y.o., female,  presents today to discuss:     Test results: completed labs.    Perimenopause: pt would like to start HRT to help manage her symptoms and help lower cholesterol.     She has appt with psych next week.     Leukocytosis: new finding. Reports her prior white count was normal a year ago. Has a strong family history of leukemia. It was believed to be genetic but she never underwent genetic testing. [place]. She has noticed more bruising over the year. She feels fatigued and has body aches. She has poor sleep. Denies bleeding.     ROS:  See HPI    Medications, allergies, past medical history, family history, surgical history, and social history documented in chart and reviewed by me.       Objective:   Exam:  /62 (BP Location: Left arm, Patient Position: Sitting, BP Cuff Size: Adult)   Pulse 83   Temp 37.1 °C (98.8 °F) (Temporal)   Resp 16   Ht 1.676 m (5' 6\")   Wt 82.3 kg (181 lb 6.4 oz)   SpO2 96%   BMI 29.28 kg/m²      General: In no acute distress. Normal appearance.   Head:   Atraumatic, normocephalic.   Neck: Supple without lymphadenopathy.   Pulmonary: Normal effort, clear to auscultation bilaterally, no wheezes, rhonchi, or rales  Cardiovascular:   Regular rate and rhythm. No murmurs, rubs, or gallops.  Musculoskeletal:  Normal ROM. No edema.    Skin: No visible rashes or lesions.  Neurological: Alert and oriented to person, place, and time. Gait normal.   Psychiatric: Normal mood and affect. Calm and friendly behavior. Speech clear. Normal judgement and insight.         Assessment & Plan:     1. Leukocytosis, unspecified type  Acute. New finding. WBC is very high 31.9. Pt has a strong family history of leukemia.  Differential diagnoses were discussed with patient.  Leukemia/lymphoma phenotyping, flow cytometry and a stat referral to " hematology were ordered.   - LEUKEMIA/LYMPHOMA PHENOTYPING, FLOW CYTOMETRY; Future  - Referral to Hematology Oncology   Latest Reference Range & Units 09/06/24 09:00   WBC 4.8 - 10.8 K/uL 31.9 (H)   RBC 4.20 - 5.40 M/uL 5.39   Hemoglobin 12.0 - 16.0 g/dL 15.6   Hematocrit 37.0 - 47.0 % 48.8 (H)   MCV 81.4 - 97.8 fL 90.5   MCH 27.0 - 33.0 pg 28.9   MCHC 32.2 - 35.5 g/dL 32.0 (L)   RDW 35.9 - 50.0 fL 51.3 (H)   Platelet Count 164 - 446 K/uL 752 (H)   MPV 9.0 - 12.9 fL 9.4   Neutrophils-Polys 44.00 - 72.00 % 71.40   Neutrophils (Absolute) 1.82 - 7.42 K/uL 23.03 (H)   Bands-Stabs 0.00 - 10.00 % 0.80   Lymphocytes 22.00 - 41.00 % 11.80 (L)   Lymphs (Absolute) 1.00 - 4.80 K/uL 3.76   Monocytes 0.00 - 13.40 % 1.70   Monos (Absolute) 0.00 - 0.85 K/uL 0.54   Eosinophils 0.00 - 6.90 % 0.90   Eos (Absolute) 0.00 - 0.51 K/uL 0.29   Basophils 0.00 - 1.80 % 10.90 (H)   Baso (Absolute) 0.00 - 0.12 K/uL 3.48 (H)   Myelocytes % 2.50   Nucleated RBC 0.00 - 0.20 /100 WBC 0.20   NRBC (Absolute) K/uL 0.05   Plt Estimation  Increased   RBC Morphology  Present   Anisocytosis  1+   Macrocytosis  2+ !   Smudge Cells  Few   Peripheral Smear Review  see below   Manual Diff Status  PERFORMED   Comment  See Comment   (H): Data is abnormally high  (L): Data is abnormally low  !: Data is abnormal      2. Family history of leukemia  - LEUKEMIA/LYMPHOMA PHENOTYPING, FLOW CYTOMETRY; Future  - Referral to Hematology Oncology    3. Dyslipidemia  New diagnosis. The 10-year ASCVD risk score (Nery DK, et al., 2019) is: 1.3%. Recommend a healthy diet, exercise, and yearly lipid panel checks.   I recommend the following to help lower your cholesterol:  Decrease intake of saturated fat. Sources of saturated fat include:  Processed meat, including hot dogs, sausage, spencer and pepperoni.  Fatty cuts of meat, including ribs, poultry with the skin and highly marbled meat.  Full-fat dairy products, including butter, heavy cream, cream cheese and sour  cream.  Coconut oil and palm oil.  Fried food.  2. Increase intake of poly-and monounsaturated fats (avocado, nuts, olive oil, soybean oil, corn oil, sunflower oil)  3. Increase intake of soluble fiber (oats, peas, beans, apples, citrus fruits, carrots, barley and psyllium)  4. Increase consumption of tree nuts  5. Increase intake of soy protein  6. Increase intake of omega-3 fatty acids from marine sources (fatty fish like salmon, flax seeds, grant seeds, walnuts.)  7. Follow a Mediterranean diet (Olive oil is main dietary fat, limited amounts of red meat, dairy products, eggs, and poultry; increased amounts of vegetables, whole grains, fish, and tree nuts).  8. Physical activity for at least 30 minutes most days of the week   Latest Reference Range & Units 09/06/24 09:00   Cholesterol,Tot 100 - 199 mg/dL 226 (H)   Triglycerides 0 - 149 mg/dL 129   HDL >=40 mg/dL 44   LDL <100 mg/dL 156 (H)   (H): Data is abnormally high    4. Perimenopause  Chronic and ongoing.  Informed patient that at this time I do not feel comfortable starting HRT until we have rule out leukemia given her very high white count. Patient verbalized understanding.  Patient will follow-up with gynecology for IUD removal as requested by patient.    5. Encounter to discuss test results  CBC, CMP, A1c, lipid panel, vitamin D, TSH, hepatitis C, HIV, hep B titer, and hormone tests reviewed with patient.  Hep B titer is negative, therefore recommend hepatitis B immunizations.  We do not have the vaccine here at the clinic.  Recommend Twinrix at the pharmacy or at the next appointment.           Diagnosis and treatment plan explained to pt. Pt agreed with treatment plan and verbalized understanding.     Return in about 2 months (around 11/13/2024) for leukocytosis/perimenopause.     Please note that this dictation was created using voice recognition software. I have made every reasonable attempt to correct obvious errors, but I expect that there are  errors of grammar and possibly content that I did not discover before finalizing the note.    Heather Zamora PA-C  [facility]

## 2024-09-14 LAB
SHBG SERPL-SCNC: 63 NMOL/L (ref 25–122)
TESTOST FREE SERPL-MCNC: 2.2 PG/ML (ref 1.1–5.8)
TESTOST SERPL-MCNC: 20 NG/DL (ref 9–55)

## 2024-09-16 ENCOUNTER — HOSPITAL ENCOUNTER (OUTPATIENT)
Dept: LAB | Facility: MEDICAL CENTER | Age: 49
End: 2024-09-16
Attending: STUDENT IN AN ORGANIZED HEALTH CARE EDUCATION/TRAINING PROGRAM
Payer: COMMERCIAL

## 2024-09-16 DIAGNOSIS — D72.829 LEUKOCYTOSIS, UNSPECIFIED TYPE: ICD-10-CM

## 2024-09-16 DIAGNOSIS — Z80.6 FAMILY HISTORY OF LEUKEMIA: ICD-10-CM

## 2024-09-16 PROCEDURE — 88184 FLOWCYTOMETRY/ TC 1 MARKER: CPT

## 2024-09-16 PROCEDURE — 88185 FLOWCYTOMETRY/TC ADD-ON: CPT | Mod: 91

## 2024-09-18 LAB
ACUTE LEUKEMIA MARKERS SPEC-IMP: NORMAL
EVENTS COUNTED SPEC: 26 MARKERS
SOURCE 9121: NORMAL

## 2024-09-19 ENCOUNTER — HOSPITAL ENCOUNTER (OUTPATIENT)
Dept: BEHAVIORAL HEALTH | Facility: MEDICAL CENTER | Age: 49
End: 2024-09-19
Attending: FAMILY MEDICINE
Payer: COMMERCIAL

## 2024-09-19 VITALS
WEIGHT: 176 LBS | BODY MASS INDEX: 28.41 KG/M2 | HEART RATE: 94 BPM | DIASTOLIC BLOOD PRESSURE: 70 MMHG | SYSTOLIC BLOOD PRESSURE: 110 MMHG

## 2024-09-19 DIAGNOSIS — F90.0 ATTENTION DEFICIT HYPERACTIVITY DISORDER, INATTENTIVE TYPE: ICD-10-CM

## 2024-09-19 DIAGNOSIS — Z79.899 HIGH RISK MEDICATION USE: ICD-10-CM

## 2024-09-19 PROCEDURE — 99204 OFFICE O/P NEW MOD 45 MIN: CPT | Performed by: FAMILY MEDICINE

## 2024-09-19 PROCEDURE — 99214 OFFICE O/P EST MOD 30 MIN: CPT | Performed by: FAMILY MEDICINE

## 2024-09-19 RX ORDER — DEXTROAMPHETAMINE SACCHARATE, AMPHETAMINE ASPARTATE MONOHYDRATE, DEXTROAMPHETAMINE SULFATE AND AMPHETAMINE SULFATE 2.5; 2.5; 2.5; 2.5 MG/1; MG/1; MG/1; MG/1
10 CAPSULE, EXTENDED RELEASE ORAL EVERY MORNING
Qty: 30 CAPSULE | Refills: 0 | Status: SHIPPED | OUTPATIENT
Start: 2024-09-19 | End: 2024-10-19

## 2024-09-19 ASSESSMENT — ANXIETY QUESTIONNAIRES
2. NOT BEING ABLE TO STOP OR CONTROL WORRYING: MORE THAN HALF THE DAYS
5. BEING SO RESTLESS THAT IT IS HARD TO SIT STILL: MORE THAN HALF THE DAYS
6. BECOMING EASILY ANNOYED OR IRRITABLE: MORE THAN HALF THE DAYS
1. FEELING NERVOUS, ANXIOUS, OR ON EDGE: NEARLY EVERY DAY
3. WORRYING TOO MUCH ABOUT DIFFERENT THINGS: MORE THAN HALF THE DAYS
7. FEELING AFRAID AS IF SOMETHING AWFUL MIGHT HAPPEN: NEARLY EVERY DAY

## 2024-09-19 ASSESSMENT — FIBROSIS 4 INDEX: FIB4 SCORE: 0.32

## 2024-09-19 ASSESSMENT — ENCOUNTER SYMPTOMS
FEVER: 0
BRUISES/BLEEDS EASILY: 0
DOUBLE VISION: 0
BLURRED VISION: 0
HEADACHES: 0
PALPITATIONS: 0
CONSTITUTIONAL NEGATIVE: 1
HEMOPTYSIS: 0
CHILLS: 0
DIZZINESS: 0
HEARTBURN: 0
GASTROINTESTINAL NEGATIVE: 1
COUGH: 0
TINGLING: 0
DEPRESSION: 0
MYALGIAS: 0
MUSCULOSKELETAL NEGATIVE: 1
CARDIOVASCULAR NEGATIVE: 1
PSYCHIATRIC NEGATIVE: 1
NEUROLOGICAL NEGATIVE: 1
EYES NEGATIVE: 1
RESPIRATORY NEGATIVE: 1
NAUSEA: 0

## 2024-09-19 ASSESSMENT — PATIENT HEALTH QUESTIONNAIRE - PHQ9
5. POOR APPETITE OR OVEREATING: 3 - NEARLY EVERY DAY
SUM OF ALL RESPONSES TO PHQ QUESTIONS 1-9: 21
CLINICAL INTERPRETATION OF PHQ2 SCORE: 3

## 2024-09-19 NOTE — PROGRESS NOTES
Psychiatric Progress Note               Author: Destin Ramirez M.D. Date & Time created: 9/19/2024  2:51 PM     Interval History:      • Displays poor listening skills N  • Loses and/or misplaces items needed to complete activities or tasks Y  • Sidetracked by external or unimportant stimuli Y  • Forgets daily activities Y  • Diminished attention span Y  • Lacks ability to complete schoolwork and other assignments or to follow  Instructions Y  • Avoids or is disinclined to begin homework or activities requiring concentrationY  • Fails to focus on details and/or makes thoughtless mistakes in schoolwork or  Assignments Y      7/8 on dsm criteria, long pattern of issues with concentration and focus dating back to childhood    Patient was advised on dietary changes that may be beneficial in ADD including elimination of artificial dyes (especially red), preservatives, and sugar. Also advised to increase aerobic and anaerobic exercise.    Long discussion and will do trial of adderall and f/u in 4 weeks for efficacy      This patient is continuing to use a controlled substance (CS) on a long term basis.  The patient is thoroughly aware of the goals of treatment with the CS  The patient is aware that yearly and random urine drug screens are required.  The patient has been instructed to take the CS only as prescribed.  The patient is prohibited from sharing the CS with any other person.  The patient is instructed to inform the provider if any other CS is taken, of any alcohol or cannabis or other recreational drug use, any treatment for side effects of the CS or complications, if they have CS active rx in other states  The patient has evidence for a reason for the CS  The treatment plan has been discussed with the patient  The  report has been reviewed        Review of Systems:  Review of Systems   Constitutional: Negative.  Negative for chills and fever.   HENT: Negative.  Negative for hearing loss.    Eyes: Negative.   Negative for blurred vision and double vision.   Respiratory: Negative.  Negative for cough and hemoptysis.    Cardiovascular: Negative.  Negative for chest pain and palpitations.   Gastrointestinal: Negative.  Negative for heartburn and nausea.   Genitourinary: Negative.  Negative for dysuria.   Musculoskeletal: Negative.  Negative for myalgias.   Skin: Negative.  Negative for rash.   Neurological: Negative.  Negative for dizziness, tingling and headaches.   Endo/Heme/Allergies: Negative.  Does not bruise/bleed easily.   Psychiatric/Behavioral: Negative.  Negative for depression and suicidal ideas.    All other systems reviewed and are negative.      Physical Exam:  Physical Exam  Vitals reviewed.   Constitutional:       Appearance: Normal appearance.   HENT:      Head: Normocephalic.   Eyes:      Extraocular Movements: Extraocular movements intact.   Neurological:      Mental Status: She is alert and oriented to person, place, and time.   Psychiatric:         Mood and Affect: Mood normal.         Thought Content: Thought content normal.         Judgment: Judgment normal.       Labs:  No results found for this or any previous visit (from the past 24 hour(s)).    Hemodynamics:  No data recorded.     Pulse  Av  Min: 94  Max: 94  Blood Pressure: 110/70     Respiratory:                Fluids:  No intake or output data in the 24 hours ending 24 1451  Weight: 79.8 kg (176 lb)  GI/Nutrition:  No orders of the defined types were placed in this encounter.    Medications:  Current Outpatient Medications   Medication   • albuterol 108 (90 Base) MCG/ACT Aero Soln inhalation aerosol   • Probiotic Product (PROBIOTIC DAILY PO)   • multivitamin Tab     No current facility-administered medications for this encounter.     Medical Decision Making, by Problem:  There are no active hospital problems to display for this patient.    Plan:    adhd

## 2024-09-25 ENCOUNTER — HOSPITAL ENCOUNTER (OUTPATIENT)
Dept: HEMATOLOGY ONCOLOGY | Facility: MEDICAL CENTER | Age: 49
End: 2024-09-25
Attending: INTERNAL MEDICINE
Payer: COMMERCIAL

## 2024-09-25 VITALS
HEIGHT: 66 IN | OXYGEN SATURATION: 95 % | WEIGHT: 177.58 LBS | SYSTOLIC BLOOD PRESSURE: 98 MMHG | TEMPERATURE: 98.8 F | DIASTOLIC BLOOD PRESSURE: 74 MMHG | HEART RATE: 101 BPM | BODY MASS INDEX: 28.54 KG/M2

## 2024-09-25 DIAGNOSIS — D72.829 LEUKOCYTOSIS, UNSPECIFIED TYPE: ICD-10-CM

## 2024-09-25 PROCEDURE — 99204 OFFICE O/P NEW MOD 45 MIN: CPT | Performed by: INTERNAL MEDICINE

## 2024-09-25 PROCEDURE — 99212 OFFICE O/P EST SF 10 MIN: CPT | Performed by: INTERNAL MEDICINE

## 2024-09-25 ASSESSMENT — PAIN SCALES - GENERAL: PAINLEVEL: 4=SLIGHT-MODERATE PAIN

## 2024-09-25 ASSESSMENT — ENCOUNTER SYMPTOMS
SPUTUM PRODUCTION: 0
PALPITATIONS: 0
ABDOMINAL PAIN: 0
VOMITING: 0
FEVER: 0
TINGLING: 0
COUGH: 0
MEMORY LOSS: 0
NECK PAIN: 0
HEARTBURN: 0
SORE THROAT: 0
SENSORY CHANGE: 0
BRUISES/BLEEDS EASILY: 0
FOCAL WEAKNESS: 0
DIZZINESS: 0
TREMORS: 0
HEADACHES: 0
WHEEZING: 0
ORTHOPNEA: 0
NAUSEA: 0
BLURRED VISION: 0
SHORTNESS OF BREATH: 0
CHILLS: 0
DEPRESSION: 0
WEIGHT LOSS: 0

## 2024-09-25 ASSESSMENT — FIBROSIS 4 INDEX: FIB4 SCORE: 0.32

## 2024-09-25 NOTE — PROGRESS NOTES
Consult:  Hematology/Oncology      Referring Physician: See chart  Primary Care:  Heather Zamora P.A.-C.    Diagnosis: Leukocytosis    Chief Complaint:  New Patient (Leukocytosis )      History of Presenting Illness:  Cary Cano is a 49 y.o. female who was reestablishing with primary care provider had peripheral labs done and was found to have an elevated white count elevated platelet count.  She has had what looks to be a peripheral blood flow cytometry does not look to show monoclonal disorder.  Patient presents for further testing and treatment as indicated.      Past Medical History:   Diagnosis Date    Anxiety     Depression        Past Surgical History:   Procedure Laterality Date    PRIMARY C SECTION         Social History     Tobacco Use    Smoking status: Never    Smokeless tobacco: Never   Vaping Use    Vaping status: Former   Substance Use Topics    Alcohol use: Yes     Alcohol/week: 1.8 oz     Types: 3 Standard drinks or equivalent per week    Drug use: No        Family History   Problem Relation Age of Onset    Cancer Mother         ovarian    Cancer Father         leukemia    Hypertension Father     Diabetes Sister         type 2    Hypertension Paternal Aunt     Hypertension Paternal Uncle     Cancer Maternal Grandfather         bladder    Diabetes Paternal Grandfather        Allergies as of 09/25/2024    (No Known Allergies)         Current Outpatient Medications:     amphetamine-dextroamphetamine XR (ADDERALL XR, 10MG,) 10 MG CAPSULE SR 24 HR, Take 1 Capsule by mouth every morning for 30 days., Disp: 30 Capsule, Rfl: 0    Probiotic Product (PROBIOTIC DAILY PO), Take  by mouth., Disp: , Rfl:     multivitamin Tab, Take 1 Tablet by mouth every day., Disp: , Rfl:     albuterol 108 (90 Base) MCG/ACT Aero Soln inhalation aerosol, Inhale 2 Puffs every 6 hours as needed for Shortness of Breath., Disp: 8.5 g, Rfl: 3    Review of Systems:  Review of Systems   Constitutional:  Negative for chills, fever,  "malaise/fatigue and weight loss.   HENT:  Negative for congestion, ear pain, nosebleeds and sore throat.    Eyes:  Negative for blurred vision.   Respiratory:  Negative for cough, sputum production, shortness of breath and wheezing.    Cardiovascular:  Negative for chest pain, palpitations, orthopnea and leg swelling.   Gastrointestinal:  Negative for abdominal pain, heartburn, nausea and vomiting.   Genitourinary:  Negative for dysuria, frequency and urgency.   Musculoskeletal:  Negative for neck pain.   Neurological:  Negative for dizziness, tingling, tremors, sensory change, focal weakness and headaches.   Endo/Heme/Allergies:  Does not bruise/bleed easily.   Psychiatric/Behavioral:  Negative for depression, memory loss and suicidal ideas.    All other systems reviewed and are negative.         Physical Exam:  Vitals:    09/25/24 1449   BP: 98/74   BP Location: Left arm   Patient Position: Sitting   BP Cuff Size: Adult   Pulse: (!) 101   Temp: 37.1 °C (98.8 °F)   TempSrc: Temporal   SpO2: 95%   Weight: 80.6 kg (177 lb 9.3 oz)   Height: 1.676 m (5' 5.98\")       DESC; KARNOFSKY SCALE WITH ECOG EQUIVALENT: 100, Fully active, able to carry on all pre-disease performed without restriction (ECOG equivalent 0)    DISTRESS LEVEL: no apparent distress    Physical Exam  Constitutional:       General: She is not in acute distress.  HENT:      Head: Normocephalic.   Eyes:      Conjunctiva/sclera: Conjunctivae normal.   Cardiovascular:      Rate and Rhythm: Normal rate and regular rhythm.      Heart sounds: Normal heart sounds.   Pulmonary:      Effort: Pulmonary effort is normal.      Breath sounds: Normal breath sounds.   Abdominal:      General: Bowel sounds are normal.      Palpations: Abdomen is soft.   Musculoskeletal:         General: Normal range of motion.   Lymphadenopathy:      Cervical: No cervical adenopathy.   Skin:     General: Skin is dry.   Neurological:      General: No focal deficit present.      Mental " Status: She is oriented to person, place, and time.   Psychiatric:         Thought Content: Thought content normal.          Depression Screening    Little interest or pleasure in doing things?      Feeling down, depressed , or hopeless?     Trouble falling or staying asleep, or sleeping too much?      Feeling tired or having little energy?      Poor appetite or overeating?      Feeling bad about yourself - or that you are a failure or have let yourself or your family down?     Trouble concentrating on things, such as reading the newspaper or watching television?     Moving or speaking so slowly that other people could have noticed.  Or the opposite - being so fidgety or restless that you have been moving around a lot more than usual?      Thoughts that you would be better off dead, or of hurting yourself?      Patient Health Questionnaire Score:       If depressive symptoms identified deferred to follow up visit unless specifically addressed in assesment and plan.    Labs:  Hospital Outpatient Visit on 09/16/2024   Component Date Value Ref Range Status    Source: 09/16/2024 Blood   Final    Number of Markers 09/16/2024 26  markers Final    Impression, Leuk/Lymph Phenotype 09/16/2024 See Note   Final    Comment:     SAMPLE: Peripheral Blood    IMPRESSION:  1. Relative increase of left shifted granulocytes (87% of viable  leukocytes) with minor immunophenotypic aberrancy. See comment 1.    2. Relatively increased basophils accounting for 5% of viable  leukocytes.    COMMENT:  1. These findings can be seen in reactive settings or a myeloid  neoplasm. Clinical and morphologic correlation will be required to  make a definitive diagnosis.    POPULATION PHENOTYPE:  1. Positive: partial CD10, partial CD11b, CD13, partial CD16,  CD33, subset CD56, CD64  Negative: CD34,   Aberrancy: Small subset CD56 positive    2. Positive: CD11b, CD13, CD33, CD38, CD45  Negative: HLA-DR, CD10, CD34, CD64,   Aberrancy:  None    ANALYSIS  Differential:  Lymphocytes: 8.5%  Monocytes: 2.5% (Subset CD56 positive, of uncertain significance)  Granulocytes: 87% (Including 5% basophils)    Blasts: 0.31%, No abnormality    Lymphocytes:  B-cells: 9.3%  T-cells: 79%  NK-cells: 12%    Kappa:Lambda Ratio: approx. 2:1  CD4                           :CD8 Ratio: approx. 2:1    Viability: 92%    Markers run: HLA-DR, Kappa, Lambda, CD2, CD3, CD4, CD5, CD7, CD8,  CD10, CD11b, CD13, CD14, CD16, CD19, CD20, CD23, CD33, CD34, CD38,  CD45, CD56, CD57, CD64, ,   Num of Markers Run: 26    This result has been reviewed and approved by Chantel Arias M.D.  09/19/2024  INTERPRETIVE INFORMATION: Leuk/Lymph Phenotyping, Flow Cytometry  This test was developed and its performance characteristics  determined by [facility]. It has not been cleared or  approved by the US Food and Drug Administration. This test was  performed in a CLIA certified laboratory and is intended for  clinical purposes.  Performed By: [facility]  [address] [place] [id number]  : Scott Clark MD, PhD  CLIA Number: [id number]     Hospital Outpatient Visit on 09/06/2024   Component Date Value Ref Range Status    Hep B Surface Antibody Quant 09/06/2024 <3.50  0.00 - 10.00 mIU/mL Final    Comment: Individual is considered to be not immune to infection with HBV.  Reference Interval: anti-HBs  < 8.5 mIU/mL..........Negative.  8.5 - 11.4 mIU/mL..........Indeterminate.  = 11.5 mIU/mL..........Positive.  Assay performance characteristics have not been established when the Elecsys  Anti HBs assay is used in conjunction with other manufacturers' assays for  specific HBV serological markers.  For post-vaccination antibody testing guidelines for the general public,  refer to MMWR December 23, 2005/Vol. 54 (No.16);1-23, and for healthcare  workers, refer to MMWR December 20, 2013/Vol.62(No. 10);1-19.      Luteinizing Hormone 09/06/2024 5.1  IU/L  Final    Comment: LUTEINIZING HORMONE REFERENCE RANGES:  Female               Male  >17 yrs                                      1.7 - 8.6 IU/L  Follicular             2.4 - 12.6 IU/L  Mid-Cycle             14.0 - 95.6 IU/L  Luteal                 1.0 - 11.4 IU/L  Postmenopausal         7.7 - 58.5 IU/L  -----------------------------------------------------------  Bashir Stage I:         0.0 - 9.3  IU/L      0.0 - 1.0 IU/L  Bashir Stage II:        0.0 - 16.0 IU/L      0.0 - 3.6 IU/L  Bashir Stage III:       0.0 - 23.0 IU/L      0.2 - 6.4 IU/L  Bashir Stage IV:        0.0 - 19.1 IU/L      0.9 - 8.3 IU/L      Follicle Stimulating Hormone 09/06/2024 4.3  mIU/mL Final    Comment: FOLLICULAR STIMULATING HORMONE REFERENCE RANGE  Female               Male  >17 yrs  Follicular             3.5 - 12.5  Mid-Cycle              4.7 - 21.5  Luteal                 1.7 - 7.7  Postmenopausal         25.8 - 134.8  ------------------------------------------------------  Bashir Stage I:         0.6 - 8.4           0.3 - 2.9  Bashir Stage II:        0.6 - 8.9           0.5 - 4.8  Bashir Stage III:       0.5 - 8.9           1.0 - 6.4  Bashir Stage IV:        0.7 - 9.3           1.0 - 8.1      Progesterone 09/06/2024 6.45  ng/mL Final    Comment: Reference ranges:    Adult Male         Adult Female  10 yrs and up        0.2 - 1.4  Follicular Phase                        0.2 - 1.5  Luteal Phase                            1.7 - 27.0  Post-menopausal                         0.1 - 0.8  Pregnancy, 1st trimester                8.8 - 48.6  Pregnancy, 2nd trimester                12.4 - 75.8  Pregnancy, 3rd trimester                58.5 - 222.3      Testosterone,Total 09/06/2024 20  9 - 55 ng/dL Final    Comment: REFERENCE INTERVAL: Testosterone by Mass Spec  Females  Premenopausal  9-55 ng/dL  Postmenopausal 5-32 ng/dL  INTERPRETIVE INFORMATION: Testosterone by Mass Spec  Free or bioavailable testosterone measurements may provide  supportive  information.  For individuals on testosterone-suppressing hormone therapies  (e.g., antiandrogens or estrogens), refer to cisgender female  reference intervals. For a complete set of all established  reference intervals, refer to [facility]/Tests/Pub/[id number].  This test was developed and its performance characteristics  determined by [facility]. It has not been cleared or  approved by the US Food and Drug Administration. This test was  performed in a CLIA certified laboratory and is intended for  clinical purposes.      Sex Hormone Bind Globulin 09/06/2024 63  25 - 122 nmol/L Final    Comment: REFERENCE INTERVAL: Sex Hormone Binding Globulin  Access complete set of age- and/or gender-specific reference  intervals for this test in the [facility]  ([employer]).      Testosterone Fr LCMS 09/06/2024 2.2  1.1 - 5.8 pg/mL Final    Comment: REFERENCE INTERVAL: Testosterone, Free by Mass Spec  Females  Postmenopausal: 0.6 - 3.8 pg/mL  INTERPRETIVE INFORMATION: Testosterone, Free by Mass Spec  Free testosterone concentration is calculated using total  testosterone (measured by mass spectrometry) and the binding  constant of testosterone and sex hormone-binding globulin (SHBG).  For individuals on testosterone-suppressing hormone therapies  (e.g., antiandrogens or estrogens), refer to cisgender female  reference intervals. For a complete set of all established  reference intervals, refer to [facility]/Tests/Pub/[id number].  This test was developed and its performance characteristics  determined by [facility]. It has not been cleared or  approved by the US Food and Drug Administration. This test was  performed in a CLIA certified laboratory and is intended for  clinical purposes.  Performed By: [facility]  [address] [place] [id number]  : Scott Clark MD, PhD  CLIA Number:                            [id number]      Prolactin 09/06/2024 7.03   2.80 - 26.00 ng/mL Final    HIV Ag/Ab Combo Assay 09/06/2024 Non-Reactive  Non Reactive Final    Comment: Roche HIV is a diagnostic test for the qualitative determination of HIV-1  p24 antigen and antibodies to HIV-1 (HIV-1 groups M and O) and HIV-2 in  human serum and plasma. A negative screen does not preclude the possibility  of exposure or infection. Consider retesting in high-risk patients, if  clinically indicated.      Hepatitis C Antibody 09/06/2024 Non-Reactive  Non-Reactive Final    Comment: Antibodies to HCV were not detected.  The Roche anti-HCV is a diagnostic test for the qualitative determination of  antibodies to the hepatitis C virus in human serum and plasma. The results  should be used and interpreted only in the context of the overall clinical  picture. A negative test result does not exclude the possibility of exposure  to hepatitis C virus.  Note: Assay performance characteristics have not been established in  populations of immunocompromised or immunosuppressed patients.      Cholesterol,Tot 09/06/2024 226 (H)  100 - 199 mg/dL Final    Triglycerides 09/06/2024 129  0 - 149 mg/dL Final    HDL 09/06/2024 44  >=40 mg/dL Final    LDL 09/06/2024 156 (H)  <100 mg/dL Final    WBC 09/06/2024 31.9 (H)  4.8 - 10.8 K/uL Final    RBC 09/06/2024 5.39  4.20 - 5.40 M/uL Final    Hemoglobin 09/06/2024 15.6  12.0 - 16.0 g/dL Final    Hematocrit 09/06/2024 48.8 (H)  37.0 - 47.0 % Final    MCV 09/06/2024 90.5  81.4 - 97.8 fL Final    MCH 09/06/2024 28.9  27.0 - 33.0 pg Final    MCHC 09/06/2024 32.0 (L)  32.2 - 35.5 g/dL Final    RDW 09/06/2024 51.3 (H)  35.9 - 50.0 fL Final    Platelet Count 09/06/2024 752 (H)  164 - 446 K/uL Final    MPV 09/06/2024 9.4  9.0 - 12.9 fL Final    Neutrophils-Polys 09/06/2024 71.40  44.00 - 72.00 % Final    Lymphocytes 09/06/2024 11.80 (L)  22.00 - 41.00 % Final    Monocytes 09/06/2024 1.70  0.00 - 13.40 % Final    Eosinophils 09/06/2024 0.90  0.00 - 6.90 % Final    Basophils  09/06/2024 10.90 (H)  0.00 - 1.80 % Final    Nucleated RBC 09/06/2024 0.20  0.00 - 0.20 /100 WBC Final    Neutrophils (Absolute) 09/06/2024 23.03 (H)  1.82 - 7.42 K/uL Final    Includes immature neutrophils, if present.    Lymphs (Absolute) 09/06/2024 3.76  1.00 - 4.80 K/uL Final    Monos (Absolute) 09/06/2024 0.54  0.00 - 0.85 K/uL Final    Eos (Absolute) 09/06/2024 0.29  0.00 - 0.51 K/uL Final    Baso (Absolute) 09/06/2024 3.48 (H)  0.00 - 0.12 K/uL Final    NRBC (Absolute) 09/06/2024 0.05  K/uL Final    Anisocytosis 09/06/2024 1+   Final    Macrocytosis 09/06/2024 2+ (A)   Final    Sodium 09/06/2024 142  135 - 145 mmol/L Final    Potassium 09/06/2024 3.8  3.6 - 5.5 mmol/L Final    Chloride 09/06/2024 102  96 - 112 mmol/L Final    Co2 09/06/2024 28  20 - 33 mmol/L Final    Anion Gap 09/06/2024 12.0  7.0 - 16.0 Final    Glucose 09/06/2024 77  65 - 99 mg/dL Final    Bun 09/06/2024 11  8 - 22 mg/dL Final    Creatinine 09/06/2024 0.99  0.50 - 1.40 mg/dL Final    Calcium 09/06/2024 8.9  8.5 - 10.5 mg/dL Final    Correct Calcium 09/06/2024 8.7  8.5 - 10.5 mg/dL Final    AST(SGOT) 09/06/2024 27  12 - 45 U/L Final    ALT(SGPT) 09/06/2024 29  2 - 50 U/L Final    Alkaline Phosphatase 09/06/2024 65  30 - 99 U/L Final    Total Bilirubin 09/06/2024 0.5  0.1 - 1.5 mg/dL Final    Albumin 09/06/2024 4.2  3.2 - 4.9 g/dL Final    Total Protein 09/06/2024 6.8  6.0 - 8.2 g/dL Final    Globulin 09/06/2024 2.6  1.9 - 3.5 g/dL Final    A-G Ratio 09/06/2024 1.6  g/dL Final    TSH 09/06/2024 3.120  0.380 - 5.330 uIU/mL Final    Comment: The 2011 American Thyroid Association (YURIY) guidelines  recommended that the interpretation of thyroid function in  pregnancy be based on trimester specific reference ranges.    1st Trimester  0.100-2.500 mIU/L  2nd Trimester  0.200-3.000 mIU/L  3rd Trimester  0.300-3.500 mIU/L    These established reference ranges have not been validated  at [facility] [employer].      Glycohemoglobin 09/06/2024 5.0   4.0 - 5.6 % Final    Comment: Increased risk for diabetes:  5.7 -6.4%  Diabetes:  >6.4%  Glycemic control for adults with diabetes:  <7.0%    The above interpretations are per ADA guidelines.  Diagnosis  of diabetes mellitus on the basis of elevated Hemoglobin A1c  should be confirmed by repeating the Hb A1c test.      Est Avg Glucose 09/06/2024 97  mg/dL Final    Comment: The eAG calculation is based on the A1c-Derived Daily Glucose  (ADAG) study.  See the ADA's website for additional information.      25-Hydroxy   Vitamin D 25 09/06/2024 32  30 - 100 ng/mL Final    Comment: Adult Ranges:   <20 ng/mL - Deficiency  20-29 ng/mL - Insufficiency   ng/mL - Sufficiency  Electrochemiluminescence binding assay performed using Roche arslan e  immunoassay analyzer.  The Elecsys Vitamin D total II assay is intended for  the quantitative determination of total 25 hydroxyvitamin D in human serum  and plasma. This assay is to be used as an aid in the assessment of vitamin  D sufficiency in adults.      Fasting Status 09/06/2024 Fasting   Final    Estradiol-E2 09/06/2024 152.0  pg/mL Final    Comment: REFERENCE INTERVAL: Estradiol by Mass Spec  For a complete set of all established reference intervals, refer  to [employer]/Tests/Pub/[id number].  This test was developed and its performance characteristics  determined by [facility]. It has not been cleared or  approved by the US Food and Drug Administration. This test was  performed in a CLIA certified laboratory and is intended for  clinical purposes.      Estrone Serum 09/06/2024 104.2  pg/mL Final    Comment: INTERPRETIVE INFORMATION: Estrone by Mass Spec  For a complete set of all established reference intervals, refer  to [employer]/Tests/Pub/[id number].  This test was developed and its performance characteristics  determined by [employer]. It has not been cleared or  approved by the US Food and Drug Administration. This test was  performed in a CLIA  certified laboratory and is intended for  clinical purposes.      Total Estrogen 09/06/2024 256.2  pg/mL Final    Comment: Reference interval of estrogens  (pg/mL)   Estrone      Estradiol    Total Estrogens  Early follicular   <150.0        30.0-100.0   30.0-250.0  Late follicular     100.0-250.0  100.0-400.0  200.0-650.0  Luteal             <200.0        50.0-150.0   50.0-350.0  Post-menopausal     3.0-32.0     2.0-21.0     5.0-52.0  REFERENCE INTERVAL: Estrogens Total Calculation  For a complete set of all established reference intervals, refer  to [facility]/Tests/Pub/[id number].  Performed By: [facility]  [address] [place] [address]  : Scott Clark MD, PhD  CLIA Number: [id number]      GFR (CKD-EPI) 09/06/2024 70  >60 mL/min/1.73 m 2 Final    Comment: Estimated Glomerular Filtration Rate is calculated using  race neutral CKD-EPI 2021 equation per NKF-ASN recommendations.      Bands-Stabs 09/06/2024 0.80  0.00 - 10.00 % Final    Myelocytes 09/06/2024 2.50  % Final    Manual Diff Status 09/06/2024 PERFORMED   Final    Comment 09/06/2024 See Comment   Final    Comment: Differential performed on albumin slide due to an increased number of  smudge cells.      Peripheral Smear Review 09/06/2024 see below   Final    Comment: Due to instrument suspect flags, further review of peripheral smear is  indicated on this patient sample. This review may or may not result in  abnormal findings.      Plt Estimation 09/06/2024 Increased   Final    RBC Morphology 09/06/2024 Present   Final    Smudge Cells 09/06/2024 Few   Final       Imaging:   All listed images below have been independently reviewed by me. I agree with the findings as summarized below:    No results found.     Pathology:  N/A    Assessment & Plan:  Assessment & Plan  Leukocytosis, unspecified type    Orders:    CBC WITH DIFFERENTIAL; Future    Comp Metabolic Panel; Future        Peripheral blood leukocytosis and  thrombocytosis.  Patient has normal hemoglobin.  Looks to have with his a peripheral blood flow cytometry with no monoclonal abnormality identified.  Will check pulmonary aspirate and biopsy.  Patient also states that she has had several relatives who have  of CLL and had genetic testing which was positive.  Ultimately we will need to look into genetic testing as well.  OTC analgesics for cancer pain.  Maintain nutrition by mouth.    Any questions and concerns raised by the patient were answered to the best of my ability. Thank you for allowing me to participate in the care for this patient. Please feel free to contact me for any questions or concerns.     Gabino Luis M.D.

## 2024-09-30 DIAGNOSIS — D72.829 LEUKOCYTOSIS, UNSPECIFIED TYPE: ICD-10-CM

## 2024-09-30 DIAGNOSIS — Z79.899 HIGH RISK MEDICATION USE: ICD-10-CM

## 2024-10-01 ENCOUNTER — HOSPITAL ENCOUNTER (OUTPATIENT)
Dept: LAB | Facility: MEDICAL CENTER | Age: 49
End: 2024-10-01
Attending: INTERNAL MEDICINE
Payer: COMMERCIAL

## 2024-10-01 DIAGNOSIS — D72.829 LEUKOCYTOSIS, UNSPECIFIED TYPE: ICD-10-CM

## 2024-10-01 LAB
ALBUMIN SERPL BCP-MCNC: 4.2 G/DL (ref 3.2–4.9)
ALBUMIN/GLOB SERPL: 1.8 G/DL
ALP SERPL-CCNC: 74 U/L (ref 30–99)
ALT SERPL-CCNC: 35 U/L (ref 2–50)
ANION GAP SERPL CALC-SCNC: 13 MMOL/L (ref 7–16)
AST SERPL-CCNC: 33 U/L (ref 12–45)
BASOPHILS # BLD AUTO: 6.9 % (ref 0–1.8)
BASOPHILS # BLD: 2.2 K/UL (ref 0–0.12)
BILIRUB SERPL-MCNC: 0.6 MG/DL (ref 0.1–1.5)
BUN SERPL-MCNC: 11 MG/DL (ref 8–22)
CALCIUM ALBUM COR SERPL-MCNC: 9.3 MG/DL (ref 8.5–10.5)
CALCIUM SERPL-MCNC: 9.5 MG/DL (ref 8.5–10.5)
CHLORIDE SERPL-SCNC: 103 MMOL/L (ref 96–112)
CO2 SERPL-SCNC: 24 MMOL/L (ref 20–33)
CREAT SERPL-MCNC: 1 MG/DL (ref 0.5–1.4)
EOSINOPHIL # BLD AUTO: 0.29 K/UL (ref 0–0.51)
EOSINOPHIL NFR BLD: 0.9 % (ref 0–6.9)
ERYTHROCYTE [DISTWIDTH] IN BLOOD BY AUTOMATED COUNT: 52.1 FL (ref 35.9–50)
FASTING STATUS PATIENT QL REPORTED: NORMAL
GFR SERPLBLD CREATININE-BSD FMLA CKD-EPI: 69 ML/MIN/1.73 M 2
GLOBULIN SER CALC-MCNC: 2.4 G/DL (ref 1.9–3.5)
GLUCOSE SERPL-MCNC: 88 MG/DL (ref 65–99)
HCT VFR BLD AUTO: 46.5 % (ref 37–47)
HGB BLD-MCNC: 15.2 G/DL (ref 12–16)
LYMPHOCYTES # BLD AUTO: 3.03 K/UL (ref 1–4.8)
LYMPHOCYTES NFR BLD: 9.5 % (ref 22–41)
MANUAL DIFF BLD: NORMAL
MCH RBC QN AUTO: 29.6 PG (ref 27–33)
MCHC RBC AUTO-ENTMCNC: 32.7 G/DL (ref 32.2–35.5)
MCV RBC AUTO: 90.6 FL (ref 81.4–97.8)
METAMYELOCYTES NFR BLD MANUAL: 3.4 %
MONOCYTES # BLD AUTO: 1.37 K/UL (ref 0–0.85)
MONOCYTES NFR BLD AUTO: 4.3 % (ref 0–13.4)
MORPHOLOGY BLD-IMP: NORMAL
MYELOCYTES NFR BLD MANUAL: 2.6 %
NEUTROPHILS # BLD AUTO: 23.1 K/UL (ref 1.82–7.42)
NEUTROPHILS NFR BLD: 71.5 % (ref 44–72)
NEUTS BAND NFR BLD MANUAL: 0.9 % (ref 0–10)
NRBC # BLD AUTO: 0.05 K/UL
NRBC BLD-RTO: 0.2 /100 WBC (ref 0–0.2)
PLATELET # BLD AUTO: 796 K/UL (ref 164–446)
PLATELET BLD QL SMEAR: NORMAL
PMV BLD AUTO: 9.4 FL (ref 9–12.9)
POTASSIUM SERPL-SCNC: 3.9 MMOL/L (ref 3.6–5.5)
PROT SERPL-MCNC: 6.6 G/DL (ref 6–8.2)
RBC # BLD AUTO: 5.13 M/UL (ref 4.2–5.4)
RBC BLD AUTO: NORMAL
SODIUM SERPL-SCNC: 140 MMOL/L (ref 135–145)
WBC # BLD AUTO: 31.9 K/UL (ref 4.8–10.8)

## 2024-10-01 PROCEDURE — 36415 COLL VENOUS BLD VENIPUNCTURE: CPT

## 2024-10-01 PROCEDURE — 85007 BL SMEAR W/DIFF WBC COUNT: CPT

## 2024-10-01 PROCEDURE — 85027 COMPLETE CBC AUTOMATED: CPT

## 2024-10-01 PROCEDURE — 80053 COMPREHEN METABOLIC PANEL: CPT

## 2024-10-02 ENCOUNTER — APPOINTMENT (OUTPATIENT)
Dept: HEMATOLOGY ONCOLOGY | Facility: MEDICAL CENTER | Age: 49
End: 2024-10-02
Payer: COMMERCIAL

## 2024-10-02 ENCOUNTER — HOSPITAL ENCOUNTER (OUTPATIENT)
Facility: MEDICAL CENTER | Age: 49
End: 2024-10-02
Attending: NURSE PRACTITIONER
Payer: COMMERCIAL

## 2024-10-02 VITALS
HEART RATE: 76 BPM | TEMPERATURE: 97.8 F | RESPIRATION RATE: 18 BRPM | DIASTOLIC BLOOD PRESSURE: 88 MMHG | SYSTOLIC BLOOD PRESSURE: 115 MMHG | OXYGEN SATURATION: 100 %

## 2024-10-02 DIAGNOSIS — D72.829 LEUKOCYTOSIS, UNSPECIFIED TYPE: ICD-10-CM

## 2024-10-02 DIAGNOSIS — D75.839 THROMBOCYTOSIS: ICD-10-CM

## 2024-10-02 LAB — PATHOLOGY CONSULT NOTE: NORMAL

## 2024-10-02 PROCEDURE — 88264 CHROMOSOME ANALYSIS 20-25: CPT

## 2024-10-02 PROCEDURE — 88237 TISSUE CULTURE BONE MARROW: CPT

## 2024-10-02 PROCEDURE — 38222 DX BONE MARROW BX & ASPIR: CPT

## 2024-10-02 PROCEDURE — 88184 FLOWCYTOMETRY/ TC 1 MARKER: CPT

## 2024-10-02 PROCEDURE — 38222 DX BONE MARROW BX & ASPIR: CPT | Performed by: NURSE PRACTITIONER

## 2024-10-02 PROCEDURE — 88305 TISSUE EXAM BY PATHOLOGIST: CPT | Mod: 59

## 2024-10-02 PROCEDURE — 88311 DECALCIFY TISSUE: CPT

## 2024-10-02 PROCEDURE — 88185 FLOWCYTOMETRY/TC ADD-ON: CPT | Mod: 91

## 2024-10-02 PROCEDURE — 88360 TUMOR IMMUNOHISTOCHEM/MANUAL: CPT

## 2024-10-02 PROCEDURE — 88374 M/PHMTRC ALYS ISHQUANT/SEMIQ: CPT

## 2024-10-02 PROCEDURE — 88313 SPECIAL STAINS GROUP 2: CPT | Mod: 91

## 2024-10-16 ENCOUNTER — TELEPHONE (OUTPATIENT)
Dept: BEHAVIORAL HEALTH | Facility: CLINIC | Age: 49
End: 2024-10-16
Payer: COMMERCIAL

## 2024-10-22 ENCOUNTER — APPOINTMENT (OUTPATIENT)
Dept: BEHAVIORAL HEALTH | Facility: MEDICAL CENTER | Age: 49
End: 2024-10-22
Attending: FAMILY MEDICINE
Payer: COMMERCIAL

## 2024-10-28 ENCOUNTER — HOSPITAL ENCOUNTER (OUTPATIENT)
Dept: LAB | Facility: MEDICAL CENTER | Age: 49
End: 2024-10-28
Attending: INTERNAL MEDICINE
Payer: COMMERCIAL

## 2024-10-28 ENCOUNTER — HOSPITAL ENCOUNTER (OUTPATIENT)
Dept: HEMATOLOGY ONCOLOGY | Facility: MEDICAL CENTER | Age: 49
End: 2024-10-28
Attending: INTERNAL MEDICINE
Payer: COMMERCIAL

## 2024-10-28 ENCOUNTER — TELEPHONE (OUTPATIENT)
Dept: HEMATOLOGY ONCOLOGY | Facility: MEDICAL CENTER | Age: 49
End: 2024-10-28

## 2024-10-28 VITALS
TEMPERATURE: 98.8 F | BODY MASS INDEX: 29.89 KG/M2 | SYSTOLIC BLOOD PRESSURE: 120 MMHG | WEIGHT: 186 LBS | OXYGEN SATURATION: 96 % | DIASTOLIC BLOOD PRESSURE: 68 MMHG | HEIGHT: 66 IN | HEART RATE: 90 BPM

## 2024-10-28 DIAGNOSIS — C92.10 CML (CHRONIC MYELOCYTIC LEUKEMIA) (HCC): ICD-10-CM

## 2024-10-28 LAB
BASOPHILS # BLD AUTO: 6.8 % (ref 0–1.8)
BASOPHILS # BLD: 3.46 K/UL (ref 0–0.12)
EOSINOPHIL # BLD AUTO: 1.73 K/UL (ref 0–0.51)
EOSINOPHIL NFR BLD: 3.4 % (ref 0–6.9)
ERYTHROCYTE [DISTWIDTH] IN BLOOD BY AUTOMATED COUNT: 56.6 FL (ref 35.9–50)
HCT VFR BLD AUTO: 44.1 % (ref 37–47)
HGB BLD-MCNC: 14 G/DL (ref 12–16)
LYMPHOCYTES # BLD AUTO: 6.92 K/UL (ref 1–4.8)
LYMPHOCYTES NFR BLD: 13.6 % (ref 22–41)
MANUAL DIFF BLD: NORMAL
MCH RBC QN AUTO: 29.5 PG (ref 27–33)
MCHC RBC AUTO-ENTMCNC: 31.7 G/DL (ref 32.2–35.5)
MCV RBC AUTO: 93 FL (ref 81.4–97.8)
METAMYELOCYTES NFR BLD MANUAL: 2.5 %
MONOCYTES # BLD AUTO: 2.6 K/UL (ref 0–0.85)
MONOCYTES NFR BLD AUTO: 5.1 % (ref 0–13.4)
MORPHOLOGY BLD-IMP: NORMAL
MYELOCYTES NFR BLD MANUAL: 4.2 %
NEUTROPHILS # BLD AUTO: 32.78 K/UL (ref 1.82–7.42)
NEUTROPHILS NFR BLD: 62.7 % (ref 44–72)
NEUTS BAND NFR BLD MANUAL: 1.7 % (ref 0–10)
NRBC # BLD AUTO: 0.43 K/UL
NRBC BLD-RTO: 0.8 /100 WBC (ref 0–0.2)
PLATELET # BLD AUTO: 740 K/UL (ref 164–446)
PLATELET BLD QL SMEAR: NORMAL
PMV BLD AUTO: 9.3 FL (ref 9–12.9)
RBC # BLD AUTO: 4.74 M/UL (ref 4.2–5.4)
RBC BLD AUTO: NORMAL
WBC # BLD AUTO: 50.9 K/UL (ref 4.8–10.8)

## 2024-10-28 PROCEDURE — 80053 COMPREHEN METABOLIC PANEL: CPT

## 2024-10-28 PROCEDURE — 99212 OFFICE O/P EST SF 10 MIN: CPT | Performed by: INTERNAL MEDICINE

## 2024-10-28 PROCEDURE — 85027 COMPLETE CBC AUTOMATED: CPT

## 2024-10-28 PROCEDURE — 85007 BL SMEAR W/DIFF WBC COUNT: CPT

## 2024-10-28 PROCEDURE — 99214 OFFICE O/P EST MOD 30 MIN: CPT | Performed by: INTERNAL MEDICINE

## 2024-10-28 PROCEDURE — 36415 COLL VENOUS BLD VENIPUNCTURE: CPT

## 2024-10-28 RX ORDER — DEXTROAMPHETAMINE SACCHARATE, AMPHETAMINE ASPARTATE, DEXTROAMPHETAMINE SULFATE AND AMPHETAMINE SULFATE 2.5; 2.5; 2.5; 2.5 MG/1; MG/1; MG/1; MG/1
10 TABLET ORAL DAILY
COMMUNITY
End: 2024-10-29 | Stop reason: SDUPTHER

## 2024-10-28 RX ORDER — IMATINIB MESYLATE 400 MG/1
400 TABLET, FILM COATED ORAL DAILY
Qty: 30 TABLET | Refills: 11 | Status: SHIPPED | OUTPATIENT
Start: 2024-10-28

## 2024-10-28 ASSESSMENT — ENCOUNTER SYMPTOMS
SHORTNESS OF BREATH: 0
COUGH: 0
WHEEZING: 0
HEADACHES: 0
NECK PAIN: 0
FOCAL WEAKNESS: 0
BLURRED VISION: 0
SORE THROAT: 0
TINGLING: 0
VOMITING: 0
ABDOMINAL PAIN: 0
PALPITATIONS: 0
HEARTBURN: 0
FEVER: 0
DEPRESSION: 0
WEIGHT LOSS: 0
MEMORY LOSS: 0
BRUISES/BLEEDS EASILY: 0
TREMORS: 0
NAUSEA: 0
DIZZINESS: 0
SPUTUM PRODUCTION: 0
ORTHOPNEA: 0
SENSORY CHANGE: 0
CHILLS: 0

## 2024-10-28 ASSESSMENT — FIBROSIS 4 INDEX: FIB4 SCORE: 0.34

## 2024-10-28 ASSESSMENT — PAIN SCALES - GENERAL: PAINLEVEL: NO PAIN

## 2024-10-28 NOTE — TELEPHONE ENCOUNTER
Patient went to get labs drawn, and pharmacy told her that we need to contact insurance company and get authorization.  Bcr-Abl! Qual with [employer].      Insurance will authorize this, we just have to jump through this hoop.    Please call patient when this gets authorized.

## 2024-10-29 ENCOUNTER — TELEPHONE (OUTPATIENT)
Dept: HEMATOLOGY ONCOLOGY | Facility: MEDICAL CENTER | Age: 49
End: 2024-10-29
Payer: COMMERCIAL

## 2024-10-29 ENCOUNTER — HOSPITAL ENCOUNTER (OUTPATIENT)
Dept: BEHAVIORAL HEALTH | Facility: MEDICAL CENTER | Age: 49
End: 2024-10-29
Attending: FAMILY MEDICINE
Payer: COMMERCIAL

## 2024-10-29 VITALS
HEART RATE: 93 BPM | DIASTOLIC BLOOD PRESSURE: 70 MMHG | SYSTOLIC BLOOD PRESSURE: 118 MMHG | WEIGHT: 186 LBS | OXYGEN SATURATION: 95 % | BODY MASS INDEX: 30.04 KG/M2

## 2024-10-29 DIAGNOSIS — F90.0 ATTENTION DEFICIT HYPERACTIVITY DISORDER, INATTENTIVE TYPE: ICD-10-CM

## 2024-10-29 LAB
ALBUMIN SERPL BCP-MCNC: 4 G/DL (ref 3.2–4.9)
ALBUMIN/GLOB SERPL: 1.4 G/DL
ALP SERPL-CCNC: 228 U/L (ref 30–99)
ALT SERPL-CCNC: 134 U/L (ref 2–50)
ANION GAP SERPL CALC-SCNC: 9 MMOL/L (ref 7–16)
AST SERPL-CCNC: 91 U/L (ref 12–45)
BILIRUB SERPL-MCNC: 0.5 MG/DL (ref 0.1–1.5)
BUN SERPL-MCNC: 10 MG/DL (ref 8–22)
CALCIUM ALBUM COR SERPL-MCNC: 8.9 MG/DL (ref 8.5–10.5)
CALCIUM SERPL-MCNC: 8.9 MG/DL (ref 8.5–10.5)
CHLORIDE SERPL-SCNC: 105 MMOL/L (ref 96–112)
CO2 SERPL-SCNC: 25 MMOL/L (ref 20–33)
CREAT SERPL-MCNC: 0.98 MG/DL (ref 0.5–1.4)
GFR SERPLBLD CREATININE-BSD FMLA CKD-EPI: 71 ML/MIN/1.73 M 2
GLOBULIN SER CALC-MCNC: 2.9 G/DL (ref 1.9–3.5)
GLUCOSE SERPL-MCNC: 84 MG/DL (ref 65–99)
POTASSIUM SERPL-SCNC: 3.8 MMOL/L (ref 3.6–5.5)
PROT SERPL-MCNC: 6.9 G/DL (ref 6–8.2)
SODIUM SERPL-SCNC: 139 MMOL/L (ref 135–145)

## 2024-10-29 PROCEDURE — 99214 OFFICE O/P EST MOD 30 MIN: CPT | Performed by: FAMILY MEDICINE

## 2024-10-29 RX ORDER — DEXTROAMPHETAMINE SACCHARATE, AMPHETAMINE ASPARTATE, DEXTROAMPHETAMINE SULFATE AND AMPHETAMINE SULFATE 2.5; 2.5; 2.5; 2.5 MG/1; MG/1; MG/1; MG/1
10 TABLET ORAL DAILY
Qty: 30 TABLET | Refills: 0 | Status: SHIPPED | OUTPATIENT
Start: 2024-10-29 | End: 2024-11-28

## 2024-10-29 ASSESSMENT — ENCOUNTER SYMPTOMS
CARDIOVASCULAR NEGATIVE: 1
DEPRESSION: 0
TINGLING: 0
MUSCULOSKELETAL NEGATIVE: 1
COUGH: 0
CONSTITUTIONAL NEGATIVE: 1
BLURRED VISION: 0
NEUROLOGICAL NEGATIVE: 1
PSYCHIATRIC NEGATIVE: 1
HEARTBURN: 0
HEMOPTYSIS: 0
DOUBLE VISION: 0
NAUSEA: 0
PALPITATIONS: 0
CHILLS: 0
HEADACHES: 0
EYES NEGATIVE: 1
RESPIRATORY NEGATIVE: 1
MYALGIAS: 0
GASTROINTESTINAL NEGATIVE: 1
BRUISES/BLEEDS EASILY: 0
FEVER: 0
DIZZINESS: 0

## 2024-10-29 ASSESSMENT — FIBROSIS 4 INDEX: FIB4 SCORE: 0.51

## 2024-12-02 ENCOUNTER — HOSPITAL ENCOUNTER (OUTPATIENT)
Dept: HEMATOLOGY ONCOLOGY | Facility: MEDICAL CENTER | Age: 49
End: 2024-12-02
Payer: COMMERCIAL

## 2024-12-14 ENCOUNTER — HOSPITAL ENCOUNTER (OUTPATIENT)
Dept: LAB | Facility: MEDICAL CENTER | Age: 49
End: 2024-12-14
Attending: INTERNAL MEDICINE
Payer: COMMERCIAL

## 2024-12-14 LAB
ALBUMIN SERPL BCP-MCNC: 4.3 G/DL (ref 3.2–4.9)
ALBUMIN/GLOB SERPL: 1.6 G/DL
ALP SERPL-CCNC: 100 U/L (ref 30–99)
ALT SERPL-CCNC: 35 U/L (ref 2–50)
ANION GAP SERPL CALC-SCNC: 11 MMOL/L (ref 7–16)
ANISOCYTOSIS BLD QL SMEAR: ABNORMAL
AST SERPL-CCNC: 33 U/L (ref 12–45)
BASOPHILS # BLD AUTO: 10.4 % (ref 0–1.8)
BASOPHILS # BLD: 3.21 K/UL (ref 0–0.12)
BILIRUB SERPL-MCNC: 0.4 MG/DL (ref 0.1–1.5)
BUN SERPL-MCNC: 13 MG/DL (ref 8–22)
CALCIUM ALBUM COR SERPL-MCNC: 8.8 MG/DL (ref 8.5–10.5)
CALCIUM SERPL-MCNC: 9 MG/DL (ref 8.5–10.5)
CHLORIDE SERPL-SCNC: 104 MMOL/L (ref 96–112)
CO2 SERPL-SCNC: 25 MMOL/L (ref 20–33)
CREAT SERPL-MCNC: 1.1 MG/DL (ref 0.5–1.4)
EOSINOPHIL # BLD AUTO: 2.16 K/UL (ref 0–0.51)
EOSINOPHIL NFR BLD: 7 % (ref 0–6.9)
ERYTHROCYTE [DISTWIDTH] IN BLOOD BY AUTOMATED COUNT: 53 FL (ref 35.9–50)
GFR SERPLBLD CREATININE-BSD FMLA CKD-EPI: 62 ML/MIN/1.73 M 2
GLOBULIN SER CALC-MCNC: 2.7 G/DL (ref 1.9–3.5)
GLUCOSE SERPL-MCNC: 82 MG/DL (ref 65–99)
HCT VFR BLD AUTO: 46.8 % (ref 37–47)
HGB BLD-MCNC: 15 G/DL (ref 12–16)
LYMPHOCYTES # BLD AUTO: 3.77 K/UL (ref 1–4.8)
LYMPHOCYTES NFR BLD: 12.2 % (ref 22–41)
MANUAL DIFF BLD: NORMAL
MCH RBC QN AUTO: 29.2 PG (ref 27–33)
MCHC RBC AUTO-ENTMCNC: 32.1 G/DL (ref 32.2–35.5)
MCV RBC AUTO: 91.2 FL (ref 81.4–97.8)
METAMYELOCYTES NFR BLD MANUAL: 1.7 %
MICROCYTES BLD QL SMEAR: ABNORMAL
MONOCYTES # BLD AUTO: 1.08 K/UL (ref 0–0.85)
MONOCYTES NFR BLD AUTO: 3.5 % (ref 0–13.4)
MORPHOLOGY BLD-IMP: NORMAL
MYELOCYTES NFR BLD MANUAL: 0.9 %
NEUTROPHILS # BLD AUTO: 19.87 K/UL (ref 1.82–7.42)
NEUTROPHILS NFR BLD: 64.3 % (ref 44–72)
NRBC # BLD AUTO: 0.1 K/UL
NRBC BLD-RTO: 0.3 /100 WBC (ref 0–0.2)
PLATELET # BLD AUTO: 711 K/UL (ref 164–446)
PLATELET BLD QL SMEAR: NORMAL
PMV BLD AUTO: 9.4 FL (ref 9–12.9)
POTASSIUM SERPL-SCNC: 4.1 MMOL/L (ref 3.6–5.5)
PROT SERPL-MCNC: 7 G/DL (ref 6–8.2)
RBC # BLD AUTO: 5.13 M/UL (ref 4.2–5.4)
RBC BLD AUTO: PRESENT
SODIUM SERPL-SCNC: 140 MMOL/L (ref 135–145)
WBC # BLD AUTO: 30.9 K/UL (ref 4.8–10.8)

## 2024-12-14 PROCEDURE — 85007 BL SMEAR W/DIFF WBC COUNT: CPT

## 2024-12-14 PROCEDURE — 36415 COLL VENOUS BLD VENIPUNCTURE: CPT

## 2024-12-14 PROCEDURE — 80053 COMPREHEN METABOLIC PANEL: CPT

## 2024-12-14 PROCEDURE — 85027 COMPLETE CBC AUTOMATED: CPT

## 2025-01-14 ENCOUNTER — HOSPITAL ENCOUNTER (OUTPATIENT)
Dept: LAB | Facility: MEDICAL CENTER | Age: 50
End: 2025-01-14
Attending: INTERNAL MEDICINE
Payer: COMMERCIAL

## 2025-01-14 LAB
ALBUMIN SERPL BCP-MCNC: 4.8 G/DL (ref 3.2–4.9)
ALBUMIN/GLOB SERPL: 2 G/DL
ALP SERPL-CCNC: 109 U/L (ref 30–99)
ALT SERPL-CCNC: 50 U/L (ref 2–50)
ANION GAP SERPL CALC-SCNC: 11 MMOL/L (ref 7–16)
AST SERPL-CCNC: 38 U/L (ref 12–45)
BASOPHILS # BLD AUTO: 1.5 % (ref 0–1.8)
BASOPHILS # BLD: 0.05 K/UL (ref 0–0.12)
BILIRUB SERPL-MCNC: 0.3 MG/DL (ref 0.1–1.5)
BUN SERPL-MCNC: 14 MG/DL (ref 8–22)
CALCIUM ALBUM COR SERPL-MCNC: 8.4 MG/DL (ref 8.5–10.5)
CALCIUM SERPL-MCNC: 9 MG/DL (ref 8.5–10.5)
CHLORIDE SERPL-SCNC: 103 MMOL/L (ref 96–112)
CO2 SERPL-SCNC: 26 MMOL/L (ref 20–33)
CREAT SERPL-MCNC: 0.87 MG/DL (ref 0.5–1.4)
EOSINOPHIL # BLD AUTO: 0.1 K/UL (ref 0–0.51)
EOSINOPHIL NFR BLD: 3 % (ref 0–6.9)
ERYTHROCYTE [DISTWIDTH] IN BLOOD BY AUTOMATED COUNT: 55 FL (ref 35.9–50)
GFR SERPLBLD CREATININE-BSD FMLA CKD-EPI: 82 ML/MIN/1.73 M 2
GLOBULIN SER CALC-MCNC: 2.4 G/DL (ref 1.9–3.5)
GLUCOSE SERPL-MCNC: 89 MG/DL (ref 65–99)
HCT VFR BLD AUTO: 39.6 % (ref 37–47)
HGB BLD-MCNC: 12.8 G/DL (ref 12–16)
IMM GRANULOCYTES # BLD AUTO: 0.01 K/UL (ref 0–0.11)
IMM GRANULOCYTES NFR BLD AUTO: 0.3 % (ref 0–0.9)
LYMPHOCYTES # BLD AUTO: 1.06 K/UL (ref 1–4.8)
LYMPHOCYTES NFR BLD: 31.5 % (ref 22–41)
MCH RBC QN AUTO: 30.4 PG (ref 27–33)
MCHC RBC AUTO-ENTMCNC: 32.3 G/DL (ref 32.2–35.5)
MCV RBC AUTO: 94.1 FL (ref 81.4–97.8)
MONOCYTES # BLD AUTO: 0.3 K/UL (ref 0–0.85)
MONOCYTES NFR BLD AUTO: 8.9 % (ref 0–13.4)
NEUTROPHILS # BLD AUTO: 1.84 K/UL (ref 1.82–7.42)
NEUTROPHILS NFR BLD: 54.8 % (ref 44–72)
NRBC # BLD AUTO: 0 K/UL
NRBC BLD-RTO: 0 /100 WBC (ref 0–0.2)
PLATELET # BLD AUTO: 262 K/UL (ref 164–446)
PMV BLD AUTO: 8.8 FL (ref 9–12.9)
POTASSIUM SERPL-SCNC: 3.9 MMOL/L (ref 3.6–5.5)
PROT SERPL-MCNC: 7.2 G/DL (ref 6–8.2)
RBC # BLD AUTO: 4.21 M/UL (ref 4.2–5.4)
SODIUM SERPL-SCNC: 140 MMOL/L (ref 135–145)
WBC # BLD AUTO: 3.4 K/UL (ref 4.8–10.8)

## 2025-01-14 PROCEDURE — 36415 COLL VENOUS BLD VENIPUNCTURE: CPT

## 2025-01-14 PROCEDURE — 80053 COMPREHEN METABOLIC PANEL: CPT

## 2025-01-14 PROCEDURE — 85025 COMPLETE CBC W/AUTO DIFF WBC: CPT

## 2025-01-15 ENCOUNTER — HOSPITAL ENCOUNTER (EMERGENCY)
Facility: MEDICAL CENTER | Age: 50
End: 2025-01-15
Attending: EMERGENCY MEDICINE
Payer: COMMERCIAL

## 2025-01-15 ENCOUNTER — APPOINTMENT (OUTPATIENT)
Dept: RADIOLOGY | Facility: MEDICAL CENTER | Age: 50
End: 2025-01-15
Attending: EMERGENCY MEDICINE
Payer: COMMERCIAL

## 2025-01-15 VITALS
DIASTOLIC BLOOD PRESSURE: 82 MMHG | OXYGEN SATURATION: 97 % | RESPIRATION RATE: 15 BRPM | SYSTOLIC BLOOD PRESSURE: 130 MMHG | WEIGHT: 185.19 LBS | BODY MASS INDEX: 29.76 KG/M2 | HEIGHT: 66 IN | HEART RATE: 85 BPM | TEMPERATURE: 96.8 F

## 2025-01-15 DIAGNOSIS — T88.7XXA MEDICATION SIDE EFFECT: ICD-10-CM

## 2025-01-15 DIAGNOSIS — R07.89 CHEST DISCOMFORT: ICD-10-CM

## 2025-01-15 LAB
ALBUMIN SERPL BCP-MCNC: 4.4 G/DL (ref 3.2–4.9)
ALBUMIN/GLOB SERPL: 1.6 G/DL
ALP SERPL-CCNC: 104 U/L (ref 30–99)
ALT SERPL-CCNC: 65 U/L (ref 2–50)
ANION GAP SERPL CALC-SCNC: 10 MMOL/L (ref 7–16)
AST SERPL-CCNC: 51 U/L (ref 12–45)
BASOPHILS # BLD AUTO: 1.2 % (ref 0–1.8)
BASOPHILS # BLD: 0.05 K/UL (ref 0–0.12)
BILIRUB SERPL-MCNC: 0.5 MG/DL (ref 0.1–1.5)
BUN SERPL-MCNC: 13 MG/DL (ref 8–22)
CALCIUM ALBUM COR SERPL-MCNC: 8.6 MG/DL (ref 8.5–10.5)
CALCIUM SERPL-MCNC: 8.9 MG/DL (ref 8.5–10.5)
CHLORIDE SERPL-SCNC: 107 MMOL/L (ref 96–112)
CO2 SERPL-SCNC: 24 MMOL/L (ref 20–33)
CREAT SERPL-MCNC: 0.91 MG/DL (ref 0.5–1.4)
EKG IMPRESSION: NORMAL
EOSINOPHIL # BLD AUTO: 0.1 K/UL (ref 0–0.51)
EOSINOPHIL NFR BLD: 2.5 % (ref 0–6.9)
ERYTHROCYTE [DISTWIDTH] IN BLOOD BY AUTOMATED COUNT: 54 FL (ref 35.9–50)
GFR SERPLBLD CREATININE-BSD FMLA CKD-EPI: 78 ML/MIN/1.73 M 2
GLOBULIN SER CALC-MCNC: 2.8 G/DL (ref 1.9–3.5)
GLUCOSE SERPL-MCNC: 83 MG/DL (ref 65–99)
HCG SERPL QL: NEGATIVE
HCT VFR BLD AUTO: 40.1 % (ref 37–47)
HGB BLD-MCNC: 13.1 G/DL (ref 12–16)
IMM GRANULOCYTES # BLD AUTO: 0.01 K/UL (ref 0–0.11)
IMM GRANULOCYTES NFR BLD AUTO: 0.2 % (ref 0–0.9)
LYMPHOCYTES # BLD AUTO: 1.28 K/UL (ref 1–4.8)
LYMPHOCYTES NFR BLD: 31.9 % (ref 22–41)
MCH RBC QN AUTO: 30.3 PG (ref 27–33)
MCHC RBC AUTO-ENTMCNC: 32.7 G/DL (ref 32.2–35.5)
MCV RBC AUTO: 92.6 FL (ref 81.4–97.8)
MONOCYTES # BLD AUTO: 0.3 K/UL (ref 0–0.85)
MONOCYTES NFR BLD AUTO: 7.5 % (ref 0–13.4)
NEUTROPHILS # BLD AUTO: 2.27 K/UL (ref 1.82–7.42)
NEUTROPHILS NFR BLD: 56.7 % (ref 44–72)
NRBC # BLD AUTO: 0 K/UL
NRBC BLD-RTO: 0 /100 WBC (ref 0–0.2)
NT-PROBNP SERPL IA-MCNC: 80 PG/ML (ref 0–125)
PLATELET # BLD AUTO: 253 K/UL (ref 164–446)
PMV BLD AUTO: 8.3 FL (ref 9–12.9)
POTASSIUM SERPL-SCNC: 4.1 MMOL/L (ref 3.6–5.5)
PROT SERPL-MCNC: 7.2 G/DL (ref 6–8.2)
RBC # BLD AUTO: 4.33 M/UL (ref 4.2–5.4)
SODIUM SERPL-SCNC: 141 MMOL/L (ref 135–145)
TROPONIN T SERPL-MCNC: <6 NG/L (ref 6–19)
WBC # BLD AUTO: 4 K/UL (ref 4.8–10.8)

## 2025-01-15 PROCEDURE — 99285 EMERGENCY DEPT VISIT HI MDM: CPT

## 2025-01-15 PROCEDURE — 85025 COMPLETE CBC W/AUTO DIFF WBC: CPT

## 2025-01-15 PROCEDURE — 83880 ASSAY OF NATRIURETIC PEPTIDE: CPT

## 2025-01-15 PROCEDURE — 84484 ASSAY OF TROPONIN QUANT: CPT

## 2025-01-15 PROCEDURE — 93005 ELECTROCARDIOGRAM TRACING: CPT | Mod: TC

## 2025-01-15 PROCEDURE — 80053 COMPREHEN METABOLIC PANEL: CPT

## 2025-01-15 PROCEDURE — 36415 COLL VENOUS BLD VENIPUNCTURE: CPT

## 2025-01-15 PROCEDURE — 84703 CHORIONIC GONADOTROPIN ASSAY: CPT

## 2025-01-15 PROCEDURE — 93005 ELECTROCARDIOGRAM TRACING: CPT | Mod: TC | Performed by: EMERGENCY MEDICINE

## 2025-01-15 PROCEDURE — 71045 X-RAY EXAM CHEST 1 VIEW: CPT

## 2025-01-15 ASSESSMENT — FIBROSIS 4 INDEX: FIB4 SCORE: 0.98

## 2025-01-15 ASSESSMENT — PAIN DESCRIPTION - PAIN TYPE: TYPE: ACUTE PAIN

## 2025-01-15 NOTE — ED NOTES
Ambulates to trauma 1, changed into a gown, placed on the monitor, VSS, family at the bedside, chart up for ERP.

## 2025-01-15 NOTE — ED PROVIDER NOTES
ER Provider Note    Scribed for Ty Harris M.D. by Loki Lozoya. 1/15/2025   1:43 PM    Primary Care Provider: Heather aZmora P.A.-C.    CHIEF COMPLAINT  Chief Complaint   Patient presents with    Chest Pressure     Pt presents with chest pressure and pain that has been present for approximately 4 weeks, that has been intermittently gotten worse. Pt has been taking a medication for her leukemia that has a side effect of chest pressure for approximately 4 weeks. Pt was instructed to come to the ED for evaluation from her oncologist.      EXTERNAL RECORDS REVIEWED      HPI/ROS  LIMITATION TO HISTORY   Select: : None  OUTSIDE HISTORIAN(S):  Significant other is present at bedside and helped her provide her history today.     Cary Cano is a 49 y.o. female who presents to the ED for evaluation of worsening chest pressure onset two weeks ago. Patient reports that she has a history of leukemia and she is on Dasatinib, which is known to have strong side effects. She began to experience chest pressure about four weeks ago after she began taking the medication. For the past two weeks, she has noticed that her chest pressure has been worsening, and she describes her pain as feeling like she has been kicked in the chest. Patient describes some pain radiation into her left shoulder and arm, and her watch has alerted her that her heart rate was abnormal. She denies any shortness of breath or fever. She recently missed three doses of her medication, and reports symptomatic improvement when off of the medication. Patient presents today to make sure her symptoms are due to medication side effects, and to evaluate if it is safe for her to continue use. She is followed with Dr. Luis locally and Dr. Salguero through the [facility].     PAST MEDICAL HISTORY  Past Medical History:   Diagnosis Date    Anxiety     Cancer (HCC)     Depression        SURGICAL HISTORY  Past Surgical History:   Procedure Laterality  "Date    PRIMARY C SECTION         FAMILY HISTORY  Family History   Problem Relation Age of Onset    Cancer Mother         ovarian    Cancer Father         leukemia    Hypertension Father     Diabetes Sister         type 2    Hypertension Paternal Aunt     Hypertension Paternal Uncle     Cancer Maternal Grandfather         bladder    Diabetes Paternal Grandfather        SOCIAL HISTORY   reports that she has never smoked. She has never used smokeless tobacco. She reports current alcohol use of about 1.8 oz of alcohol per week. She reports that she does not use drugs.    CURRENT MEDICATIONS  Previous Medications    ALBUTEROL 108 (90 BASE) MCG/ACT AERO SOLN INHALATION AEROSOL    Inhale 2 Puffs every 6 hours as needed for Shortness of Breath.    DASATINIB 100 MG TAB    Take 100 mg by mouth every day.    IMATINIB (GLEEVEC) 400 MG TABLET    Take 1 Tablet by mouth every day.    MULTIVITAMIN TAB    Take 1 Tablet by mouth every day.    PROBIOTIC PRODUCT (PROBIOTIC DAILY PO)    Take  by mouth.       ALLERGIES  No Known Allergies     PHYSICAL EXAM  /84   Pulse 77   Temp 36 °C (96.8 °F) (Temporal)   Resp 16   Ht 1.676 m (5' 6\")   Wt 84 kg (185 lb 3 oz)   LMP 12/15/2024 (Approximate)   SpO2 96%   BMI 29.89 kg/m²    Nursing note and vitals reviewed.  Constitutional: Well-developed and well-nourished. No distress.   HENT: Head is normocephalic and atraumatic. Oropharynx is clear and moist without exudate or erythema.   Eyes: Pupils are equal, round, and reactive to light. Conjunctiva are normal.   Cardiovascular: Normal rate and regular rhythm. No murmur heard. Normal radial pulses.  Pulmonary/Chest: Breath sounds normal. No wheezes or rales.   Abdominal: Soft and non-tender. No distention    Musculoskeletal: Extremities exhibit normal range of motion without edema or tenderness.   Neurological: Awake, alert and oriented to person, place, and time. No focal deficits noted.  Skin: Skin is warm and dry. No rash. "   Psychiatric: Normal mood and affect. Appropriate for clinical situation    DIAGNOSTIC STUDIES    Labs:   Results for orders placed or performed during the hospital encounter of 01/15/25   EKG    Collection Time: 01/15/25 12:02 PM   Result Value Ref Range    Report       [facility]    Test Date:  2025-01-15  Pt Name:    TADEO CARRILLO                 Department: ER  MRN:        [id number]                      Room:  Gender:     Female                       Technician: [id number]  :        [id number]                   Requested By:ER TRIAGE PROTOCOL  Order #:    [id number]                    Reading MD:    Measurements  Intervals                                Axis  Rate:       67                           P:          73  NM:         148                          QRS:        74  QRSD:       86                           T:          60  QT:         391  QTc:        413    Interpretive Statements  Sinus rhythm  No previous ECG available for comparison     CBC with Differential    Collection Time: 01/15/25 12:44 PM   Result Value Ref Range    WBC 4.0 (L) 4.8 - 10.8 K/uL    RBC 4.33 4.20 - 5.40 M/uL    Hemoglobin 13.1 12.0 - 16.0 g/dL    Hematocrit 40.1 37.0 - 47.0 %    MCV 92.6 81.4 - 97.8 fL    MCH 30.3 27.0 - 33.0 pg    MCHC 32.7 32.2 - 35.5 g/dL    RDW 54.0 (H) 35.9 - 50.0 fL    Platelet Count 253 164 - 446 K/uL    MPV 8.3 (L) 9.0 - 12.9 fL    Neutrophils-Polys 56.70 44.00 - 72.00 %    Lymphocytes 31.90 22.00 - 41.00 %    Monocytes 7.50 0.00 - 13.40 %    Eosinophils 2.50 0.00 - 6.90 %    Basophils 1.20 0.00 - 1.80 %    Immature Granulocytes 0.20 0.00 - 0.90 %    Nucleated RBC 0.00 0.00 - 0.20 /100 WBC    Neutrophils (Absolute) 2.27 1.82 - 7.42 K/uL    Lymphs (Absolute) 1.28 1.00 - 4.80 K/uL    Monos (Absolute) 0.30 0.00 - 0.85 K/uL    Eos (Absolute) 0.10 0.00 - 0.51 K/uL    Baso (Absolute) 0.05 0.00 - 0.12 K/uL    Immature Granulocytes (abs) 0.01 0.00 - 0.11 K/uL    NRBC (Absolute) 0.00  K/uL   Complete Metabolic Panel (CMP)    Collection Time: 01/15/25 12:44 PM   Result Value Ref Range    Sodium 141 135 - 145 mmol/L    Potassium 4.1 3.6 - 5.5 mmol/L    Chloride 107 96 - 112 mmol/L    Co2 24 20 - 33 mmol/L    Anion Gap 10.0 7.0 - 16.0    Glucose 83 65 - 99 mg/dL    Bun 13 8 - 22 mg/dL    Creatinine 0.91 0.50 - 1.40 mg/dL    Calcium 8.9 8.5 - 10.5 mg/dL    Correct Calcium 8.6 8.5 - 10.5 mg/dL    AST(SGOT) 51 (H) 12 - 45 U/L    ALT(SGPT) 65 (H) 2 - 50 U/L    Alkaline Phosphatase 104 (H) 30 - 99 U/L    Total Bilirubin 0.5 0.1 - 1.5 mg/dL    Albumin 4.4 3.2 - 4.9 g/dL    Total Protein 7.2 6.0 - 8.2 g/dL    Globulin 2.8 1.9 - 3.5 g/dL    A-G Ratio 1.6 g/dL   proBrain Natriuretic Peptide, NT (BNP_    Collection Time: 01/15/25 12:44 PM   Result Value Ref Range    NT-proBNP 80 0 - 125 pg/mL   Troponins NOW    Collection Time: 01/15/25 12:44 PM   Result Value Ref Range    Troponin T <6 6 - 19 ng/L   HCG Qual Serum    Collection Time: 01/15/25 12:44 PM   Result Value Ref Range    Beta-Hcg Qualitative Serum Negative Negative   ESTIMATED GFR    Collection Time: 01/15/25 12:44 PM   Result Value Ref Range    GFR (CKD-EPI) 78 >60 mL/min/1.73 m 2       EKG:   I have independently interpreted this EKG as detailed above.     Radiology:   This attending emergency physician has independently interpreted the diagnostic imaging associated with this visit and is awaiting the final reading from the radiologist.   Preliminary interpretation is a follows: Chest x-ray shows no evidence of pneumonia    Radiologist interpretation:   DX-CHEST-PORTABLE (1 VIEW)   Final Result      No acute cardiopulmonary disease evident.           ASSESSMENT AND PLAN    1:43 PM - Patient was evaluated at bedside. Patient presents today with a  history of leukemia and started on a new medication that is known to have side effects. She began having chest pain four weeks ago that has been worsening for the past two weeks. Ordered for EKG, HCG qual  serum, troponin now, BNP, CMP, CBC with differential, and DX-Chest to evaluate. Patient verbalizes understanding and support with my plan of care.  Differential diagnoses include but not limited to: Medication side effect, Pneumonia, Arrhythmia.     3:37 PM I spoke with the on-call oncologist at the [facility] covering the patient's physician today.  We reviewed the patient's history, presentation, medications, and emergency department evaluation including laboratory studies, imaging, EKG and x-ray.      At this time he is feels that the patient's workup is reassuring.  No additional workup required.  As the patient is feeling better having held her medication for the last several days he requested this medication continue to be held.  Have not seen her in clinic since November.  He would like to her to be seen in clinic next week.  He will have the office contact the patient to help arrange.    The patient will return for new or worsening symptoms and is stable at the time of discharge.    The patient is referred to a primary physician for blood pressure management, diabetic screening, and for all other preventative health concerns.    DISPOSITION:  Patient will be discharged home in stable condition.    FOLLOW UP:  Heather Zamora P.A.-C.  [address] [address] [phone]    Schedule an appointment as soon as possible for a visit       [facility], Emergency Dept  [address] [place] [id number] [phone]    If symptoms worsen    [facility], next week. They said they will call you.            OUTPATIENT MEDICATIONS:  New Prescriptions    No medications on file         FINAL DIAGNOSIS  1. Chest discomfort    2. Medication side effect         Loki ESPINOZA (Scribe), am scribing for, and in the presence of, Ty Harris M.D..    Electronically signed by: Loki Morris), 1/15/2025    Ty ESPINOZA M.D. personally performed the services  described in this documentation, as scribed by Loki Lozoya in my presence, and it is both accurate and complete.      The note accurately reflects work and decisions made by me.  Ty Harris M.D.  1/15/2025  3:38 PM

## 2025-01-15 NOTE — ED TRIAGE NOTES
"Chief Complaint   Patient presents with    Chest Pressure     Pt presents with chest pressure and pain that has been present for approximately 4 weeks, that has been intermittently gotten worse. Pt has been taking a medication for her leukemia that has a side effect of chest pressure for approximately 4 weeks. Pt was instructed to come to the ED for evaluation from her oncologist.        Pt ambulatory to triage. Pt A&Ox4, for above complaint.     Pt to lobby . Pt educated on alerting staff in changes to condition. Pt verbalized understanding. Protocol chest pain.     /84   Pulse 77   Temp 36 °C (96.8 °F) (Temporal)   Resp 16   Ht 1.676 m (5' 6\")   Wt 84 kg (185 lb 3 oz)   LMP 12/15/2024 (Approximate)   SpO2 96%   BMI 29.89 kg/m²     "

## 2025-01-28 ENCOUNTER — HOSPITAL ENCOUNTER (OUTPATIENT)
Dept: BEHAVIORAL HEALTH | Facility: MEDICAL CENTER | Age: 50
End: 2025-01-28
Attending: FAMILY MEDICINE
Payer: COMMERCIAL

## 2025-01-28 VITALS
WEIGHT: 180.8 LBS | DIASTOLIC BLOOD PRESSURE: 60 MMHG | BODY MASS INDEX: 29.18 KG/M2 | HEART RATE: 91 BPM | SYSTOLIC BLOOD PRESSURE: 116 MMHG | OXYGEN SATURATION: 96 %

## 2025-01-28 DIAGNOSIS — F90.0 ATTENTION DEFICIT HYPERACTIVITY DISORDER, INATTENTIVE TYPE: ICD-10-CM

## 2025-01-28 PROCEDURE — 99214 OFFICE O/P EST MOD 30 MIN: CPT | Performed by: FAMILY MEDICINE

## 2025-01-28 RX ORDER — DEXTROAMPHETAMINE SACCHARATE, AMPHETAMINE ASPARTATE, DEXTROAMPHETAMINE SULFATE AND AMPHETAMINE SULFATE 2.5; 2.5; 2.5; 2.5 MG/1; MG/1; MG/1; MG/1
10 TABLET ORAL DAILY
Qty: 30 TABLET | Refills: 0 | Status: SHIPPED | OUTPATIENT
Start: 2025-01-28 | End: 2025-02-27

## 2025-01-28 RX ORDER — DEXTROAMPHETAMINE SACCHARATE, AMPHETAMINE ASPARTATE, DEXTROAMPHETAMINE SULFATE AND AMPHETAMINE SULFATE 2.5; 2.5; 2.5; 2.5 MG/1; MG/1; MG/1; MG/1
10 TABLET ORAL DAILY
COMMUNITY
Start: 2024-09-09 | End: 2025-01-28 | Stop reason: SDUPTHER

## 2025-01-28 ASSESSMENT — ENCOUNTER SYMPTOMS
BRUISES/BLEEDS EASILY: 0
DEPRESSION: 0
HEARTBURN: 0
EYES NEGATIVE: 1
MYALGIAS: 0
HEMOPTYSIS: 0
HEADACHES: 0
MUSCULOSKELETAL NEGATIVE: 1
FEVER: 0
DOUBLE VISION: 0
CARDIOVASCULAR NEGATIVE: 1
TINGLING: 0
BLURRED VISION: 0
NEUROLOGICAL NEGATIVE: 1
NAUSEA: 0
COUGH: 0
GASTROINTESTINAL NEGATIVE: 1
CHILLS: 0
CONSTITUTIONAL NEGATIVE: 1
RESPIRATORY NEGATIVE: 1
DIZZINESS: 0
PALPITATIONS: 0
PSYCHIATRIC NEGATIVE: 1

## 2025-01-28 ASSESSMENT — FIBROSIS 4 INDEX: FIB4 SCORE: 1.2

## 2025-01-28 NOTE — PROGRESS NOTES
Psychiatric Progress Note               Author: Destin Ramirez M.D. Date & Time created: 1/28/2025  3:34 PM     Interval History:  Chief Complaint   Patient presents with    Follow-Up       HISTORY OF PRESENT ILLNESS: Patient is a 49 y.o. female established patient who presents today for a med refill of a controlled substance in addition to their other issues listed in the rest of the progress note    The patient is requesting medication for the following issue:adhd  Approximate date of onset of condition was years ago.    Current Problems:  ADHD      Patient Active Problem List    Diagnosis Date Noted    Leukocytosis 09/30/2024    Dyslipidemia 09/13/2024    Anxiety 07/25/2024    Severe episode of recurrent major depressive disorder, without psychotic features (HCC) 07/25/2024    Perimenopause 07/25/2024    Overweight (BMI 25.0-29.9) 07/25/2024    Nausea 07/25/2024    Diarrhea 07/25/2024    IUD (intrauterine device) in place 07/25/2024    Mild intermittent asthma without complication 07/25/2024       Allergies:Patient has no known allergies.    Current Outpatient Medications   Medication Sig Dispense Refill    amphetamine-dextroamphetamine (ADDERALL, 10MG,) 10 MG Tab Take 1 Tablet by mouth every day for 30 days. 30 Tablet 0    Dasatinib 100 MG Tab Take 100 mg by mouth every day.      Probiotic Product (PROBIOTIC DAILY PO) Take  by mouth.      multivitamin Tab Take 1 Tablet by mouth every day.      albuterol 108 (90 Base) MCG/ACT Aero Soln inhalation aerosol Inhale 2 Puffs every 6 hours as needed for Shortness of Breath. 8.5 g 3    imatinib (GLEEVEC) 400 MG tablet Take 1 Tablet by mouth every day. (Patient not taking: Reported on 1/28/2025) 30 Tablet 11     No current facility-administered medications for this encounter.         I have discussed with the patient that with any controlled substance prescription it is vital to have these medications in a safe, secure environment. I have discussed that it is their  responsibility that their medications are not accessible to anyone else who may use them inadvertently.    I have discussed with the patient that any controlled substance can impair the ability to drive or operate any machinery and that the patient should not use them if they plan on driving or operating machinery.    I have reviewed and updated the past medical/surgical, family history and social history as of today.    ROS:  Review of Systems   Constitutional: Negative for fever, chills, weight loss and malaise/fatigue.   Respiratory: Negative for cough, sputum production, shortness of breath and wheezing.    Cardiovascular: Negative for chest pain, palpitations, orthopnea and leg swelling.   Gastrointestinal: Negative for heartburn, nausea, vomiting, constipation and abdominal pain.  Musculoskeletal:  neg  Skin: Negative for rash and itching.   Neurological: neg  Psychiatric/Behavioral: Negative for signs or symptoms of depression,, suicidal or homicidal ideation.  Patient able to contract for their safety.  + anxiety  All other systems reviewed and are negative except as in HPI.      Exam:  /60   Pulse 91   Wt 82 kg (180 lb 12.8 oz)   SpO2 96%   General:  female patient who appears in no distress        DISCUSSION OF CARE PLAN:    - I discussed management options. I reviewed symptomatic care     - I will obtain/review additional records if needed    - I discussed efforts with home exercise program and activity modification     - I reviewed medication monitoring.       The patient was advised to avoid alcohol use with prescribed medication. I counseled the patient regarding risks, side effects, and interactions of medications. I counseled the patient on the controlled substance prescribing program. I reviewed further symptomatic medications.  - I reviewed additional diagnostic options: Yes.  - I reviewed additional therapeutic options: Yes  - I reviewed psychosocial interventions:   Yes      Assessment/Plan:  1. Attention deficit hyperactivity disorder, inattentive type  amphetamine-dextroamphetamine (ADDERALL, 10MG,) 10 MG Tab          A Controlled Substance Agreement has been signed within the last year. A urine drug screen has been done in the past year.      The patient reports that their insomnia is well controlled with the current treatment plan  They were counseled on other means to help with sleep   This patient is continuing to use a controlled substance (CS) on a long term basis.  The patient is thoroughly aware of the goals of treatment with the CS  The patient is aware that yearly and random urine drug screens are required.  The patient has been instructed to take the CS only as prescribed.  The patient is prohibited from sharing the CS with any other person.  The patient is instructed to inform the provider if any other CS is taken, of any alcohol or cannabis or other recreational drug use, any treatment for side effects of the CS or complications, if they have CS active rx in other states  The patient has evidence for a reason for the CS  The treatment plan has been discussed with the patient  The  report has been reviewed            Review of Systems:  Review of Systems   Constitutional: Negative.  Negative for chills and fever.   HENT: Negative.  Negative for hearing loss.    Eyes: Negative.  Negative for blurred vision and double vision.   Respiratory: Negative.  Negative for cough and hemoptysis.    Cardiovascular: Negative.  Negative for chest pain and palpitations.   Gastrointestinal: Negative.  Negative for heartburn and nausea.   Genitourinary: Negative.  Negative for dysuria.   Musculoskeletal: Negative.  Negative for myalgias.   Skin: Negative.  Negative for rash.   Neurological: Negative.  Negative for dizziness, tingling and headaches.   Endo/Heme/Allergies: Negative.  Does not bruise/bleed easily.   Psychiatric/Behavioral: Negative.  Negative for depression and  suicidal ideas.    All other systems reviewed and are negative.    [unfilled]  Past Medical History:   Diagnosis Date    Anxiety     Cancer (HCC)     Depression      Past Surgical History:   Procedure Laterality Date    PRIMARY C SECTION         Social History     Tobacco Use    Smoking status: Never    Smokeless tobacco: Never   Vaping Use    Vaping status: Former   Substance Use Topics    Alcohol use: Yes     Alcohol/week: 1.8 oz     Types: 3 Standard drinks or equivalent per week     Comment: occ    Drug use: No       Family History   Problem Relation Age of Onset    Cancer Mother         ovarian    Cancer Father         leukemia    Hypertension Father     Diabetes Sister         type 2    Hypertension Paternal Aunt     Hypertension Paternal Uncle     Cancer Maternal Grandfather         bladder    Diabetes Paternal Grandfather          Current Outpatient Medications:     amphetamine-dextroamphetamine (ADDERALL, 10MG,) 10 MG Tab, Take 1 Tablet by mouth every day for 30 days., Disp: 30 Tablet, Rfl: 0    Dasatinib 100 MG Tab, Take 100 mg by mouth every day., Disp: , Rfl:     Probiotic Product (PROBIOTIC DAILY PO), Take  by mouth., Disp: , Rfl:     multivitamin Tab, Take 1 Tablet by mouth every day., Disp: , Rfl:     albuterol 108 (90 Base) MCG/ACT Aero Soln inhalation aerosol, Inhale 2 Puffs every 6 hours as needed for Shortness of Breath., Disp: 8.5 g, Rfl: 3    imatinib (GLEEVEC) 400 MG tablet, Take 1 Tablet by mouth every day. (Patient not taking: Reported on 1/28/2025), Disp: 30 Tablet, Rfl: 11    Patient was instructed on the use of medications, either prescriptions or OTC and informed on when the appropriate follow up time period should be. In addition, patient was also instructed that should any acute worsening occur that they should notify this clinic asap or call 911.      Physical Exam:  Physical Exam  Vitals reviewed.   Constitutional:       Appearance: Normal appearance.   HENT:      Head: Normocephalic.    Eyes:      Extraocular Movements: Extraocular movements intact.   Neurological:      Mental Status: She is alert and oriented to person, place, and time.   Psychiatric:         Mood and Affect: Mood normal.         Thought Content: Thought content normal.         Judgment: Judgment normal.         Labs:  No results found for this or any previous visit (from the past 24 hours).    Hemodynamics:  No data recorded.     Pulse  Av  Min: 91  Max: 91  Blood Pressure: 116/60     Respiratory:    Pulse Oximetry: 96 %           Fluids:  No intake or output data in the 24 hours ending 25 1534  Weight: 82 kg (180 lb 12.8 oz)  GI/Nutrition:  No orders of the defined types were placed in this encounter.    Medications:  Current Outpatient Medications   Medication    amphetamine-dextroamphetamine (ADDERALL, 10MG,) 10 MG Tab    Dasatinib 100 MG Tab    Probiotic Product (PROBIOTIC DAILY PO)    multivitamin Tab    albuterol 108 (90 Base) MCG/ACT Aero Soln inhalation aerosol    imatinib (GLEEVEC) 400 MG tablet     No current facility-administered medications for this encounter.     Medical Decision Making, by Problem:  There are no active hospital problems to display for this patient.    Plan:  1. Attention deficit hyperactivity disorder, inattentive type  amphetamine-dextroamphetamine (ADDERALL, 10MG,) 10 MG Tab

## 2025-02-12 ENCOUNTER — HOSPITAL ENCOUNTER (OUTPATIENT)
Dept: LAB | Facility: MEDICAL CENTER | Age: 50
End: 2025-02-12
Attending: NURSE PRACTITIONER
Payer: COMMERCIAL

## 2025-02-12 LAB
BASOPHILS # BLD AUTO: 1.8 % (ref 0–1.8)
BASOPHILS # BLD: 0.08 K/UL (ref 0–0.12)
EOSINOPHIL # BLD AUTO: 0.1 K/UL (ref 0–0.51)
EOSINOPHIL NFR BLD: 2.2 % (ref 0–6.9)
ERYTHROCYTE [DISTWIDTH] IN BLOOD BY AUTOMATED COUNT: 50.2 FL (ref 35.9–50)
HCT VFR BLD AUTO: 43 % (ref 37–47)
HGB BLD-MCNC: 14.1 G/DL (ref 12–16)
IMM GRANULOCYTES # BLD AUTO: 0 K/UL (ref 0–0.11)
IMM GRANULOCYTES NFR BLD AUTO: 0 % (ref 0–0.9)
LYMPHOCYTES # BLD AUTO: 1.58 K/UL (ref 1–4.8)
LYMPHOCYTES NFR BLD: 35.4 % (ref 22–41)
MCH RBC QN AUTO: 30.3 PG (ref 27–33)
MCHC RBC AUTO-ENTMCNC: 32.8 G/DL (ref 32.2–35.5)
MCV RBC AUTO: 92.3 FL (ref 81.4–97.8)
MONOCYTES # BLD AUTO: 0.3 K/UL (ref 0–0.85)
MONOCYTES NFR BLD AUTO: 6.7 % (ref 0–13.4)
NEUTROPHILS # BLD AUTO: 2.4 K/UL (ref 1.82–7.42)
NEUTROPHILS NFR BLD: 53.9 % (ref 44–72)
NRBC # BLD AUTO: 0 K/UL
NRBC BLD-RTO: 0 /100 WBC (ref 0–0.2)
PLATELET # BLD AUTO: 421 K/UL (ref 164–446)
PMV BLD AUTO: 9.1 FL (ref 9–12.9)
RBC # BLD AUTO: 4.66 M/UL (ref 4.2–5.4)
WBC # BLD AUTO: 4.5 K/UL (ref 4.8–10.8)

## 2025-02-12 PROCEDURE — 80053 COMPREHEN METABOLIC PANEL: CPT

## 2025-02-12 PROCEDURE — 36415 COLL VENOUS BLD VENIPUNCTURE: CPT

## 2025-02-12 PROCEDURE — 81206 BCR/ABL1 GENE MAJOR BP: CPT

## 2025-02-12 PROCEDURE — 85025 COMPLETE CBC W/AUTO DIFF WBC: CPT

## 2025-02-13 LAB
ALBUMIN SERPL BCP-MCNC: 4.4 G/DL (ref 3.2–4.9)
ALBUMIN/GLOB SERPL: 1.6 G/DL
ALP SERPL-CCNC: 67 U/L (ref 30–99)
ALT SERPL-CCNC: 30 U/L (ref 2–50)
ANION GAP SERPL CALC-SCNC: 11 MMOL/L (ref 7–16)
AST SERPL-CCNC: 30 U/L (ref 12–45)
BILIRUB SERPL-MCNC: 0.5 MG/DL (ref 0.1–1.5)
BUN SERPL-MCNC: 12 MG/DL (ref 8–22)
CALCIUM ALBUM COR SERPL-MCNC: 8.5 MG/DL (ref 8.5–10.5)
CALCIUM SERPL-MCNC: 8.8 MG/DL (ref 8.5–10.5)
CHLORIDE SERPL-SCNC: 104 MMOL/L (ref 96–112)
CO2 SERPL-SCNC: 24 MMOL/L (ref 20–33)
CREAT SERPL-MCNC: 1 MG/DL (ref 0.5–1.4)
GFR SERPLBLD CREATININE-BSD FMLA CKD-EPI: 69 ML/MIN/1.73 M 2
GLOBULIN SER CALC-MCNC: 2.8 G/DL (ref 1.9–3.5)
GLUCOSE SERPL-MCNC: 86 MG/DL (ref 65–99)
POTASSIUM SERPL-SCNC: 3.9 MMOL/L (ref 3.6–5.5)
PROT SERPL-MCNC: 7.2 G/DL (ref 6–8.2)
SODIUM SERPL-SCNC: 139 MMOL/L (ref 135–145)

## 2025-02-19 LAB
PATHOLOGY STUDY: ABNORMAL
SPECIMEN SOURCE: ABNORMAL
T(ABL1,BCR)P210 BLD/T QL: DETECTED
T(ABL1,BCR)P210/CONTROL (IS) BLD/T: 23.24 %

## 2025-04-07 DIAGNOSIS — F90.0 ATTENTION DEFICIT HYPERACTIVITY DISORDER, INATTENTIVE TYPE: ICD-10-CM

## 2025-04-07 RX ORDER — DEXTROAMPHETAMINE SACCHARATE, AMPHETAMINE ASPARTATE, DEXTROAMPHETAMINE SULFATE AND AMPHETAMINE SULFATE 2.5; 2.5; 2.5; 2.5 MG/1; MG/1; MG/1; MG/1
10 TABLET ORAL DAILY
Qty: 30 TABLET | Refills: 0 | Status: SHIPPED | OUTPATIENT
Start: 2025-04-07 | End: 2025-05-07

## 2025-04-07 NOTE — TELEPHONE ENCOUNTER
Received request via: Patient    Was the patient seen in the last year in this department? Yes    Does the patient have an active prescription (recently filled or refills available) for medication(s) requested? No    Pharmacy Name: [facility]     Does the patient have jail Plus and need 100-day supply? (This applies to ALL medications) Patient does not have SCP

## 2025-05-08 ENCOUNTER — HOSPITAL ENCOUNTER (OUTPATIENT)
Dept: BEHAVIORAL HEALTH | Facility: MEDICAL CENTER | Age: 50
End: 2025-05-08
Attending: FAMILY MEDICINE
Payer: COMMERCIAL

## 2025-05-08 VITALS
HEART RATE: 65 BPM | BODY MASS INDEX: 29.54 KG/M2 | WEIGHT: 183 LBS | SYSTOLIC BLOOD PRESSURE: 98 MMHG | DIASTOLIC BLOOD PRESSURE: 60 MMHG | OXYGEN SATURATION: 98 %

## 2025-05-08 DIAGNOSIS — F90.0 ATTENTION DEFICIT HYPERACTIVITY DISORDER, INATTENTIVE TYPE: ICD-10-CM

## 2025-05-08 PROCEDURE — 99214 OFFICE O/P EST MOD 30 MIN: CPT | Performed by: FAMILY MEDICINE

## 2025-05-08 PROCEDURE — 99212 OFFICE O/P EST SF 10 MIN: CPT

## 2025-05-08 RX ORDER — DEXTROAMPHETAMINE SACCHARATE, AMPHETAMINE ASPARTATE MONOHYDRATE, DEXTROAMPHETAMINE SULFATE AND AMPHETAMINE SULFATE 2.5; 2.5; 2.5; 2.5 MG/1; MG/1; MG/1; MG/1
10 CAPSULE, EXTENDED RELEASE ORAL EVERY MORNING
Qty: 30 CAPSULE | Refills: 0 | Status: SHIPPED | OUTPATIENT
Start: 2025-05-08 | End: 2025-06-07

## 2025-05-08 ASSESSMENT — ENCOUNTER SYMPTOMS
RESPIRATORY NEGATIVE: 1
DOUBLE VISION: 0
BLURRED VISION: 0
COUGH: 0
DIZZINESS: 0
MUSCULOSKELETAL NEGATIVE: 1
HEMOPTYSIS: 0
GASTROINTESTINAL NEGATIVE: 1
BRUISES/BLEEDS EASILY: 0
EYES NEGATIVE: 1
DEPRESSION: 0
TINGLING: 0
NEUROLOGICAL NEGATIVE: 1
HEADACHES: 0
FEVER: 0
MYALGIAS: 0
HEARTBURN: 0
CHILLS: 0
CONSTITUTIONAL NEGATIVE: 1
NAUSEA: 0
PALPITATIONS: 0
PSYCHIATRIC NEGATIVE: 1
CARDIOVASCULAR NEGATIVE: 1

## 2025-05-08 ASSESSMENT — FIBROSIS 4 INDEX: FIB4 SCORE: 0.62

## 2025-05-08 NOTE — PROGRESS NOTES
Psychiatric Progress Note               Author: Destin Ramirez M.D. Date & Time created: 5/8/2025  10:11 AM     Interval History:  Chief Complaint   Patient presents with    Follow-Up       HISTORY OF PRESENT ILLNESS: Patient is a 49 y.o. female established patient who presents today for a med refill of a controlled substance in addition to their other issues listed in the rest of the progress note    The patient is requesting medication for the following issue:adhd  Approximate date of onset of condition was years ago.    Current Problems:  ADHD      Patient Active Problem List    Diagnosis Date Noted    Leukocytosis 09/30/2024    Dyslipidemia 09/13/2024    Anxiety 07/25/2024    Severe episode of recurrent major depressive disorder, without psychotic features (HCC) 07/25/2024    Perimenopause 07/25/2024    Overweight (BMI 25.0-29.9) 07/25/2024    Nausea 07/25/2024    Diarrhea 07/25/2024    IUD (intrauterine device) in place 07/25/2024    Mild intermittent asthma without complication 07/25/2024       Allergies:Patient has no known allergies.    Current Outpatient Medications   Medication Sig Dispense Refill    amphetamine-dextroamphetamine XR (ADDERALL XR, 10MG,) 10 MG CAPSULE SR 24 HR Take 1 Capsule by mouth every morning for 30 days. 30 Capsule 0    imatinib (GLEEVEC) 400 MG tablet Take 1 Tablet by mouth every day. 30 Tablet 11    Probiotic Product (PROBIOTIC DAILY PO) Take  by mouth.      multivitamin Tab Take 1 Tablet by mouth every day.      albuterol 108 (90 Base) MCG/ACT Aero Soln inhalation aerosol Inhale 2 Puffs every 6 hours as needed for Shortness of Breath. 8.5 g 3     No current facility-administered medications for this encounter.         I have discussed with the patient that with any controlled substance prescription it is vital to have these medications in a safe, secure environment. I have discussed that it is their responsibility that their medications are not accessible to anyone else who may  use them inadvertently.    I have discussed with the patient that any controlled substance can impair the ability to drive or operate any machinery and that the patient should not use them if they plan on driving or operating machinery.    I have reviewed and updated the past medical/surgical, family history and social history as of today.    ROS:  Review of Systems   Constitutional: Negative for fever, chills, weight loss and malaise/fatigue.   Respiratory: Negative for cough, sputum production, shortness of breath and wheezing.    Cardiovascular: Negative for chest pain, palpitations, orthopnea and leg swelling.   Gastrointestinal: Negative for heartburn, nausea, vomiting, constipation and abdominal pain.  Musculoskeletal:  neg  Skin: Negative for rash and itching.   Neurological: neg  Psychiatric/Behavioral: Negative for signs or symptoms of depression,, suicidal or homicidal ideation.  Patient able to contract for their safety.  + anxiety  All other systems reviewed and are negative except as in HPI.      Exam:  BP 98/60   Pulse 65   Wt 83 kg (183 lb)   SpO2 98%   General:  female patient who appears in no distress        DISCUSSION OF CARE PLAN:    - I discussed management options. I reviewed symptomatic care     - I will obtain/review additional records if needed    - I discussed efforts with home exercise program and activity modification     - I reviewed medication monitoring.       The patient was advised to avoid alcohol use with prescribed medication. I counseled the patient regarding risks, side effects, and interactions of medications. I counseled the patient on the controlled substance prescribing program. I reviewed further symptomatic medications.  - I reviewed additional diagnostic options: Yes.  - I reviewed additional therapeutic options: Yes  - I reviewed psychosocial interventions:  Yes      Assessment/Plan:  1. Attention deficit hyperactivity disorder, inattentive type   amphetamine-dextroamphetamine XR (ADDERALL XR, 10MG,) 10 MG CAPSULE SR 24 HR          A Controlled Substance Agreement has been signed within the last year. A urine drug screen has been done in the past year.      The patient reports that their insomnia is well controlled with the current treatment plan  They were counseled on other means to help with sleep   This patient is continuing to use a controlled substance (CS) on a long term basis.  The patient is thoroughly aware of the goals of treatment with the CS  The patient is aware that yearly and random urine drug screens are required.  The patient has been instructed to take the CS only as prescribed.  The patient is prohibited from sharing the CS with any other person.  The patient is instructed to inform the provider if any other CS is taken, of any alcohol or cannabis or other recreational drug use, any treatment for side effects of the CS or complications, if they have CS active rx in other states  The patient has evidence for a reason for the CS  The treatment plan has been discussed with the patient  The  report has been reviewed        [unfilled]  Past Medical History:   Diagnosis Date    Anxiety     Cancer (HCC)     Depression      Past Surgical History:   Procedure Laterality Date    PRIMARY C SECTION         Social History     Tobacco Use    Smoking status: Never    Smokeless tobacco: Never   Vaping Use    Vaping status: Former   Substance Use Topics    Alcohol use: Yes     Alcohol/week: 1.8 oz     Types: 3 Standard drinks or equivalent per week     Comment: occ    Drug use: No       Family History   Problem Relation Age of Onset    Cancer Mother         ovarian    Cancer Father         leukemia    Hypertension Father     Diabetes Sister         type 2    Hypertension Paternal Aunt     Hypertension Paternal Uncle     Cancer Maternal Grandfather         bladder    Diabetes Paternal Grandfather          Current Outpatient Medications:      amphetamine-dextroamphetamine XR (ADDERALL XR, 10MG,) 10 MG CAPSULE SR 24 HR, Take 1 Capsule by mouth every morning for 30 days., Disp: 30 Capsule, Rfl: 0    imatinib (GLEEVEC) 400 MG tablet, Take 1 Tablet by mouth every day., Disp: 30 Tablet, Rfl: 11    Probiotic Product (PROBIOTIC DAILY PO), Take  by mouth., Disp: , Rfl:     multivitamin Tab, Take 1 Tablet by mouth every day., Disp: , Rfl:     albuterol 108 (90 Base) MCG/ACT Aero Soln inhalation aerosol, Inhale 2 Puffs every 6 hours as needed for Shortness of Breath., Disp: 8.5 g, Rfl: 3    Patient was instructed on the use of medications, either prescriptions or OTC and informed on when the appropriate follow up time period should be. In addition, patient was also instructed that should any acute worsening occur that they should notify this clinic asap or call 911.        Review of Systems:  Review of Systems   Constitutional: Negative.  Negative for chills and fever.   HENT: Negative.  Negative for hearing loss.    Eyes: Negative.  Negative for blurred vision and double vision.   Respiratory: Negative.  Negative for cough and hemoptysis.    Cardiovascular: Negative.  Negative for chest pain and palpitations.   Gastrointestinal: Negative.  Negative for heartburn and nausea.   Genitourinary: Negative.  Negative for dysuria.   Musculoskeletal: Negative.  Negative for myalgias.   Skin: Negative.  Negative for rash.   Neurological: Negative.  Negative for dizziness, tingling and headaches.   Endo/Heme/Allergies: Negative.  Does not bruise/bleed easily.   Psychiatric/Behavioral: Negative.  Negative for depression and suicidal ideas.    All other systems reviewed and are negative.      Physical Exam:  Physical Exam  Vitals reviewed.   Constitutional:       Appearance: Normal appearance.   HENT:      Head: Normocephalic.   Eyes:      Extraocular Movements: Extraocular movements intact.   Neurological:      Mental Status: She is alert and oriented to person, place, and  time.   Psychiatric:         Mood and Affect: Mood normal.         Thought Content: Thought content normal.         Judgment: Judgment normal.         Labs:  No results found for this or any previous visit (from the past 24 hours).    Hemodynamics:  No data recorded.     Pulse  Av  Min: 65  Max: 65  Blood Pressure: 98/60     Respiratory:    Pulse Oximetry: 98 %           Fluids:  No intake or output data in the 24 hours ending 25 1011  Weight: 83 kg (183 lb)  GI/Nutrition:  No orders of the defined types were placed in this encounter.    Medications:  Current Outpatient Medications   Medication    amphetamine-dextroamphetamine XR (ADDERALL XR, 10MG,) 10 MG CAPSULE SR 24 HR    imatinib (GLEEVEC) 400 MG tablet    Probiotic Product (PROBIOTIC DAILY PO)    multivitamin Tab    albuterol 108 (90 Base) MCG/ACT Aero Soln inhalation aerosol     No current facility-administered medications for this encounter.     Medical Decision Making, by Problem:  There are no active hospital problems to display for this patient.    Plan:  1. Attention deficit hyperactivity disorder, inattentive type  amphetamine-dextroamphetamine XR (ADDERALL XR, 10MG,) 10 MG CAPSULE SR 24 HR

## 2025-05-28 NOTE — ADDENDUM NOTE
Encounter addended by: Yissel Ford on: 5/28/2025 3:52 PM   Actions taken: Charge Capture section accepted

## 2025-05-29 ENCOUNTER — HOSPITAL ENCOUNTER (OUTPATIENT)
Dept: LAB | Facility: MEDICAL CENTER | Age: 50
End: 2025-05-29
Attending: NURSE PRACTITIONER
Payer: COMMERCIAL

## 2025-05-29 LAB
ALBUMIN SERPL BCP-MCNC: 4.1 G/DL (ref 3.2–4.9)
ALBUMIN/GLOB SERPL: 1.6 G/DL
ALP SERPL-CCNC: 61 U/L (ref 30–99)
ALT SERPL-CCNC: 22 U/L (ref 2–50)
ANION GAP SERPL CALC-SCNC: 8 MMOL/L (ref 7–16)
AST SERPL-CCNC: 24 U/L (ref 12–45)
BASOPHILS # BLD AUTO: 1.2 % (ref 0–1.8)
BASOPHILS # BLD: 0.06 K/UL (ref 0–0.12)
BILIRUB SERPL-MCNC: 0.5 MG/DL (ref 0.1–1.5)
BUN SERPL-MCNC: 11 MG/DL (ref 8–22)
CALCIUM ALBUM COR SERPL-MCNC: 8.9 MG/DL (ref 8.5–10.5)
CALCIUM SERPL-MCNC: 9 MG/DL (ref 8.5–10.5)
CHLORIDE SERPL-SCNC: 105 MMOL/L (ref 96–112)
CO2 SERPL-SCNC: 25 MMOL/L (ref 20–33)
CREAT SERPL-MCNC: 1.05 MG/DL (ref 0.5–1.4)
EOSINOPHIL # BLD AUTO: 0.23 K/UL (ref 0–0.51)
EOSINOPHIL NFR BLD: 4.7 % (ref 0–6.9)
ERYTHROCYTE [DISTWIDTH] IN BLOOD BY AUTOMATED COUNT: 51 FL (ref 35.9–50)
GFR SERPLBLD CREATININE-BSD FMLA CKD-EPI: 65 ML/MIN/1.73 M 2
GLOBULIN SER CALC-MCNC: 2.5 G/DL (ref 1.9–3.5)
GLUCOSE SERPL-MCNC: 91 MG/DL (ref 65–99)
HCT VFR BLD AUTO: 42.4 % (ref 37–47)
HGB BLD-MCNC: 14.2 G/DL (ref 12–16)
IMM GRANULOCYTES # BLD AUTO: 0.01 K/UL (ref 0–0.11)
IMM GRANULOCYTES NFR BLD AUTO: 0.2 % (ref 0–0.9)
LYMPHOCYTES # BLD AUTO: 1.59 K/UL (ref 1–4.8)
LYMPHOCYTES NFR BLD: 32.3 % (ref 22–41)
MCH RBC QN AUTO: 31.1 PG (ref 27–33)
MCHC RBC AUTO-ENTMCNC: 33.5 G/DL (ref 32.2–35.5)
MCV RBC AUTO: 93 FL (ref 81.4–97.8)
MONOCYTES # BLD AUTO: 0.37 K/UL (ref 0–0.85)
MONOCYTES NFR BLD AUTO: 7.5 % (ref 0–13.4)
NEUTROPHILS # BLD AUTO: 2.67 K/UL (ref 1.82–7.42)
NEUTROPHILS NFR BLD: 54.1 % (ref 44–72)
NRBC # BLD AUTO: 0 K/UL
NRBC BLD-RTO: 0 /100 WBC (ref 0–0.2)
PLATELET # BLD AUTO: 224 K/UL (ref 164–446)
PMV BLD AUTO: 9.6 FL (ref 9–12.9)
POTASSIUM SERPL-SCNC: 4.1 MMOL/L (ref 3.6–5.5)
PROT SERPL-MCNC: 6.6 G/DL (ref 6–8.2)
RBC # BLD AUTO: 4.56 M/UL (ref 4.2–5.4)
SODIUM SERPL-SCNC: 138 MMOL/L (ref 135–145)
WBC # BLD AUTO: 4.9 K/UL (ref 4.8–10.8)

## 2025-05-29 PROCEDURE — 80053 COMPREHEN METABOLIC PANEL: CPT

## 2025-05-29 PROCEDURE — 36415 COLL VENOUS BLD VENIPUNCTURE: CPT

## 2025-05-29 PROCEDURE — 85025 COMPLETE CBC W/AUTO DIFF WBC: CPT

## 2025-07-22 DIAGNOSIS — F90.0 ATTENTION DEFICIT HYPERACTIVITY DISORDER, INATTENTIVE TYPE: ICD-10-CM

## 2025-07-22 NOTE — TELEPHONE ENCOUNTER
Received request via: Patient    Was the patient seen in the last year in this department? Yes    Does the patient have an active prescription (recently filled or refills available) for medication(s) requested? No    [facility]     Does the patient have [facility] Plus and need 100-day supply? (This applies to ALL medications) Patient does not have SCP

## 2025-07-23 RX ORDER — DEXTROAMPHETAMINE SACCHARATE, AMPHETAMINE ASPARTATE MONOHYDRATE, DEXTROAMPHETAMINE SULFATE AND AMPHETAMINE SULFATE 2.5; 2.5; 2.5; 2.5 MG/1; MG/1; MG/1; MG/1
10 CAPSULE, EXTENDED RELEASE ORAL EVERY MORNING
Qty: 30 CAPSULE | Refills: 0 | Status: SHIPPED | OUTPATIENT
Start: 2025-07-23 | End: 2025-08-22

## 2025-08-12 ENCOUNTER — HOSPITAL ENCOUNTER (OUTPATIENT)
Dept: BEHAVIORAL HEALTH | Facility: MEDICAL CENTER | Age: 50
End: 2025-08-12
Attending: FAMILY MEDICINE
Payer: COMMERCIAL

## 2025-08-12 DIAGNOSIS — F90.0 ATTENTION DEFICIT HYPERACTIVITY DISORDER, INATTENTIVE TYPE: Primary | ICD-10-CM

## 2025-08-12 DIAGNOSIS — Z79.899 HIGH RISK MEDICATION USE: ICD-10-CM

## 2025-08-12 PROCEDURE — 99214 OFFICE O/P EST MOD 30 MIN: CPT | Mod: 95 | Performed by: FAMILY MEDICINE
